# Patient Record
Sex: FEMALE | Race: WHITE | Employment: PART TIME | URBAN - METROPOLITAN AREA
[De-identification: names, ages, dates, MRNs, and addresses within clinical notes are randomized per-mention and may not be internally consistent; named-entity substitution may affect disease eponyms.]

---

## 2017-06-02 LAB
CHLAMYDIA, EXTERNAL: NEGATIVE
HIV, EXTERNAL: NEGATIVE
N. GONORRHEA, EXTERNAL: NEGATIVE
RUBELLA, EXTERNAL: NORMAL

## 2017-11-17 LAB — GRBS, EXTERNAL: NEGATIVE

## 2017-12-21 ENCOUNTER — HOSPITAL ENCOUNTER (OUTPATIENT)
Age: 27
Discharge: HOME | End: 2017-12-21
Attending: OBSTETRICS & GYNECOLOGY | Admitting: SPECIALIST
Payer: MEDICAID

## 2017-12-21 ENCOUNTER — HOSPITAL ENCOUNTER (INPATIENT)
Age: 27
LOS: 2 days | Discharge: HOME | DRG: 560 | End: 2017-12-23
Attending: OBSTETRICS & GYNECOLOGY | Admitting: SPECIALIST
Payer: MEDICAID

## 2017-12-21 VITALS
TEMPERATURE: 98.6 F | HEART RATE: 101 BPM | OXYGEN SATURATION: 100 % | WEIGHT: 180 LBS | RESPIRATION RATE: 17 BRPM | DIASTOLIC BLOOD PRESSURE: 90 MMHG | SYSTOLIC BLOOD PRESSURE: 130 MMHG | HEIGHT: 65 IN | BODY MASS INDEX: 29.99 KG/M2

## 2017-12-21 PROBLEM — G40.909 SEIZURE DISORDER (HCC): Status: ACTIVE | Noted: 2017-12-21

## 2017-12-21 PROBLEM — O47.9 UTERINE CONTRACTIONS DURING PREGNANCY: Status: ACTIVE | Noted: 2017-12-21

## 2017-12-21 PROBLEM — Z98.891 HISTORY OF CESAREAN SECTION: Status: ACTIVE | Noted: 2017-12-21

## 2017-12-21 LAB
ABO + RH BLD: NORMAL
BASOPHILS # BLD: 0 K/UL (ref 0–0.06)
BASOPHILS NFR BLD: 0 % (ref 0–2)
BLOOD GROUP ANTIBODIES SERPL: NORMAL
DIFFERENTIAL METHOD BLD: ABNORMAL
EOSINOPHIL # BLD: 0 K/UL (ref 0–0.4)
EOSINOPHIL NFR BLD: 0 % (ref 0–5)
ERYTHROCYTE [DISTWIDTH] IN BLOOD BY AUTOMATED COUNT: 13.6 % (ref 11.6–14.5)
HCT VFR BLD AUTO: 40.2 % (ref 35–45)
HGB BLD-MCNC: 13.8 G/DL (ref 12–16)
LYMPHOCYTES # BLD: 1.7 K/UL (ref 0.9–3.6)
LYMPHOCYTES NFR BLD: 18 % (ref 21–52)
MCH RBC QN AUTO: 32.2 PG (ref 24–34)
MCHC RBC AUTO-ENTMCNC: 34.3 G/DL (ref 31–37)
MCV RBC AUTO: 93.7 FL (ref 74–97)
MONOCYTES # BLD: 0.6 K/UL (ref 0.05–1.2)
MONOCYTES NFR BLD: 6 % (ref 3–10)
NEUTS SEG # BLD: 7.4 K/UL (ref 1.8–8)
NEUTS SEG NFR BLD: 76 % (ref 40–73)
PLATELET # BLD AUTO: 203 K/UL (ref 135–420)
PMV BLD AUTO: 10.1 FL (ref 9.2–11.8)
RBC # BLD AUTO: 4.29 M/UL (ref 4.2–5.3)
SPECIMEN EXP DATE BLD: NORMAL
WBC # BLD AUTO: 9.7 K/UL (ref 4.6–13.2)

## 2017-12-21 PROCEDURE — 10907ZC DRAINAGE OF AMNIOTIC FLUID, THERAPEUTIC FROM PRODUCTS OF CONCEPTION, VIA NATURAL OR ARTIFICIAL OPENING: ICD-10-PCS | Performed by: OBSTETRICS & GYNECOLOGY

## 2017-12-21 PROCEDURE — 75410000002 HC LABOR FEE PER 1 HR

## 2017-12-21 PROCEDURE — 86900 BLOOD TYPING SEROLOGIC ABO: CPT | Performed by: ADVANCED PRACTICE MIDWIFE

## 2017-12-21 PROCEDURE — 65270000029 HC RM PRIVATE

## 2017-12-21 PROCEDURE — 59025 FETAL NON-STRESS TEST: CPT

## 2017-12-21 PROCEDURE — 4A0HXCZ MEASUREMENT OF PRODUCTS OF CONCEPTION, CARDIAC RATE, EXTERNAL APPROACH: ICD-10-PCS | Performed by: OBSTETRICS & GYNECOLOGY

## 2017-12-21 PROCEDURE — 85025 COMPLETE CBC W/AUTO DIFF WBC: CPT | Performed by: ADVANCED PRACTICE MIDWIFE

## 2017-12-21 RX ORDER — MISOPROSTOL 200 UG/1
800 TABLET ORAL
Status: DISCONTINUED | OUTPATIENT
Start: 2017-12-21 | End: 2017-12-22 | Stop reason: HOSPADM

## 2017-12-21 RX ORDER — TERBUTALINE SULFATE 1 MG/ML
0.25 INJECTION SUBCUTANEOUS
Status: DISCONTINUED | OUTPATIENT
Start: 2017-12-21 | End: 2017-12-21 | Stop reason: HOSPADM

## 2017-12-21 RX ORDER — LIDOCAINE HYDROCHLORIDE 10 MG/ML
30 INJECTION, SOLUTION EPIDURAL; INFILTRATION; INTRACAUDAL; PERINEURAL AS NEEDED
Status: COMPLETED | OUTPATIENT
Start: 2017-12-21 | End: 2017-12-22

## 2017-12-21 RX ORDER — SODIUM CHLORIDE, SODIUM LACTATE, POTASSIUM CHLORIDE, CALCIUM CHLORIDE 600; 310; 30; 20 MG/100ML; MG/100ML; MG/100ML; MG/100ML
125 INJECTION, SOLUTION INTRAVENOUS CONTINUOUS
Status: DISCONTINUED | OUTPATIENT
Start: 2017-12-21 | End: 2017-12-21 | Stop reason: HOSPADM

## 2017-12-21 RX ORDER — OXYTOCIN 10 [USP'U]/ML
10 INJECTION, SOLUTION INTRAMUSCULAR; INTRAVENOUS
Status: DISCONTINUED | OUTPATIENT
Start: 2017-12-21 | End: 2017-12-21 | Stop reason: HOSPADM

## 2017-12-21 RX ORDER — ACETAMINOPHEN 650 MG/1
650 SUPPOSITORY RECTAL
Status: DISCONTINUED | OUTPATIENT
Start: 2017-12-21 | End: 2017-12-21 | Stop reason: HOSPADM

## 2017-12-21 RX ORDER — OXYTOCIN/RINGER'S LACTATE 20/1000 ML
125 PLASTIC BAG, INJECTION (ML) INTRAVENOUS CONTINUOUS
Status: DISCONTINUED | OUTPATIENT
Start: 2017-12-21 | End: 2017-12-22 | Stop reason: HOSPADM

## 2017-12-21 RX ORDER — NALBUPHINE HYDROCHLORIDE 10 MG/ML
10 INJECTION, SOLUTION INTRAMUSCULAR; INTRAVENOUS; SUBCUTANEOUS
Status: DISCONTINUED | OUTPATIENT
Start: 2017-12-21 | End: 2017-12-21 | Stop reason: HOSPADM

## 2017-12-21 RX ORDER — SODIUM CHLORIDE, SODIUM LACTATE, POTASSIUM CHLORIDE, CALCIUM CHLORIDE 600; 310; 30; 20 MG/100ML; MG/100ML; MG/100ML; MG/100ML
125 INJECTION, SOLUTION INTRAVENOUS CONTINUOUS
Status: DISCONTINUED | OUTPATIENT
Start: 2017-12-21 | End: 2017-12-22 | Stop reason: HOSPADM

## 2017-12-21 RX ORDER — METHYLERGONOVINE MALEATE 0.2 MG/ML
0.2 INJECTION INTRAVENOUS AS NEEDED
Status: DISCONTINUED | OUTPATIENT
Start: 2017-12-21 | End: 2017-12-22 | Stop reason: HOSPADM

## 2017-12-21 RX ORDER — OXYTOCIN/RINGER'S LACTATE 20/1000 ML
500 PLASTIC BAG, INJECTION (ML) INTRAVENOUS ONCE
Status: ACTIVE | OUTPATIENT
Start: 2017-12-21 | End: 2017-12-22

## 2017-12-21 RX ORDER — ONDANSETRON 2 MG/ML
4 INJECTION INTRAMUSCULAR; INTRAVENOUS
Status: DISCONTINUED | OUTPATIENT
Start: 2017-12-21 | End: 2017-12-22 | Stop reason: HOSPADM

## 2017-12-21 RX ORDER — METHYLERGONOVINE MALEATE 0.2 MG/ML
0.2 INJECTION INTRAVENOUS AS NEEDED
Status: DISCONTINUED | OUTPATIENT
Start: 2017-12-21 | End: 2017-12-21 | Stop reason: HOSPADM

## 2017-12-21 RX ORDER — TERBUTALINE SULFATE 1 MG/ML
0.25 INJECTION SUBCUTANEOUS
Status: DISCONTINUED | OUTPATIENT
Start: 2017-12-21 | End: 2017-12-22 | Stop reason: HOSPADM

## 2017-12-21 RX ORDER — OXYTOCIN/RINGER'S LACTATE 20/1000 ML
125 PLASTIC BAG, INJECTION (ML) INTRAVENOUS CONTINUOUS
Status: DISCONTINUED | OUTPATIENT
Start: 2017-12-21 | End: 2017-12-21 | Stop reason: HOSPADM

## 2017-12-21 RX ORDER — LAMOTRIGINE 50 MG/1
100 TABLET, EXTENDED RELEASE ORAL DAILY
COMMUNITY

## 2017-12-21 RX ORDER — LAMOTRIGINE 200 MG/1
TABLET ORAL DAILY
COMMUNITY

## 2017-12-21 RX ORDER — CARBOPROST TROMETHAMINE 250 UG/ML
250 INJECTION, SOLUTION INTRAMUSCULAR
Status: DISCONTINUED | OUTPATIENT
Start: 2017-12-21 | End: 2017-12-22 | Stop reason: HOSPADM

## 2017-12-21 RX ORDER — LIDOCAINE HYDROCHLORIDE 10 MG/ML
20 INJECTION, SOLUTION EPIDURAL; INFILTRATION; INTRACAUDAL; PERINEURAL AS NEEDED
Status: DISCONTINUED | OUTPATIENT
Start: 2017-12-21 | End: 2017-12-21 | Stop reason: HOSPADM

## 2017-12-21 RX ORDER — HYDROMORPHONE HCL IN 0.9% NACL 50 MG/50ML
1 PLASTIC BAG, INJECTION (ML) INJECTION
Status: DISCONTINUED | OUTPATIENT
Start: 2017-12-21 | End: 2017-12-21 | Stop reason: HOSPADM

## 2017-12-21 RX ORDER — HYDROMORPHONE HCL IN 0.9% NACL 50 MG/50ML
1 PLASTIC BAG, INJECTION (ML) INJECTION
Status: DISCONTINUED | OUTPATIENT
Start: 2017-12-21 | End: 2017-12-22 | Stop reason: HOSPADM

## 2017-12-21 RX ORDER — LAMOTRIGINE 25 MG/1
50 TABLET ORAL DAILY
Status: DISCONTINUED | OUTPATIENT
Start: 2017-12-22 | End: 2017-12-22

## 2017-12-21 RX ORDER — TRISODIUM CITRATE DIHYDRATE AND CITRIC ACID MONOHYDRATE 500; 334 MG/5ML; MG/5ML
30 SOLUTION ORAL AS NEEDED
Status: DISCONTINUED | OUTPATIENT
Start: 2017-12-21 | End: 2017-12-21 | Stop reason: HOSPADM

## 2017-12-21 RX ORDER — SALICYLIC ACID
240 POWDER (GRAM) MISCELLANEOUS ONCE
Status: DISCONTINUED | OUTPATIENT
Start: 2017-12-21 | End: 2017-12-21 | Stop reason: HOSPADM

## 2017-12-21 RX ORDER — MAG HYDROX/ALUMINUM HYD/SIMETH 200-200-20
15 SUSPENSION, ORAL (FINAL DOSE FORM) ORAL
Status: DISCONTINUED | OUTPATIENT
Start: 2017-12-21 | End: 2017-12-22 | Stop reason: HOSPADM

## 2017-12-21 RX ORDER — ONDANSETRON 2 MG/ML
4 INJECTION INTRAMUSCULAR; INTRAVENOUS
Status: DISCONTINUED | OUTPATIENT
Start: 2017-12-21 | End: 2017-12-21 | Stop reason: HOSPADM

## 2017-12-21 RX ORDER — OXYTOCIN/RINGER'S LACTATE 20/1000 ML
500 PLASTIC BAG, INJECTION (ML) INTRAVENOUS ONCE
Status: DISCONTINUED | OUTPATIENT
Start: 2017-12-21 | End: 2017-12-21 | Stop reason: HOSPADM

## 2017-12-21 RX ORDER — CARBOPROST TROMETHAMINE 250 UG/ML
250 INJECTION, SOLUTION INTRAMUSCULAR
Status: DISCONTINUED | OUTPATIENT
Start: 2017-12-21 | End: 2017-12-21 | Stop reason: HOSPADM

## 2017-12-21 RX ORDER — SALICYLIC ACID
POWDER (GRAM) MISCELLANEOUS
Status: COMPLETED
Start: 2017-12-21 | End: 2017-12-22

## 2017-12-21 RX ORDER — OXYTOCIN 10 [USP'U]/ML
10 INJECTION, SOLUTION INTRAMUSCULAR; INTRAVENOUS
Status: DISCONTINUED | OUTPATIENT
Start: 2017-12-21 | End: 2017-12-22 | Stop reason: HOSPADM

## 2017-12-21 RX ORDER — LAMOTRIGINE 100 MG/1
200 TABLET ORAL DAILY
Status: DISCONTINUED | OUTPATIENT
Start: 2017-12-22 | End: 2017-12-22

## 2017-12-21 RX ORDER — MISOPROSTOL 200 UG/1
800 TABLET ORAL
Status: DISCONTINUED | OUTPATIENT
Start: 2017-12-21 | End: 2017-12-21 | Stop reason: HOSPADM

## 2017-12-21 RX ORDER — NALBUPHINE HYDROCHLORIDE 10 MG/ML
10 INJECTION, SOLUTION INTRAMUSCULAR; INTRAVENOUS; SUBCUTANEOUS
Status: DISCONTINUED | OUTPATIENT
Start: 2017-12-21 | End: 2017-12-22 | Stop reason: HOSPADM

## 2017-12-21 NOTE — H&P
History & Physical    Name: Matilde Oseguera MRN: [id number]  SSN: [id number]    YOB: 1990  Age: 32 y.o. Sex: female        Subjective:     Idalia Jain is c/o contractions since last night, contractions now closer in frequency (q2-5 mins) and increasing in strength. Denies bleeding, LOF, and baby is having good FM. Estimated Date of Delivery: 17  OB History      Para Term  AB Living    2 1 1   1    SAB TAB Ectopic Molar Multiple Live Births        0 1          MsRobert Hedrick is admitted with pregnancy at 40w3d for labor. . Prenatal course was complicated by history of prior C/S and seizure disorder, and abnormal 1 hour. Prenatal care has been followed by Marilia FISCHER Amira Stanton starting on 17 at 11.4 weeks. Please see prenatal records for details. Abnormal 1 hr gtt at 138, with 1 abnormal value on the 3hr gtt. Total weight gain during pregnancy is 28#. Admitted to 3415/01. Past Medical History:   Diagnosis Date    Diabetes ([facility] [address]     gestational diabetes    Epilepsy [facility])      Past Surgical History:   Procedure Laterality Date     DELIVERY ONLY  2016    HX WISDOM TEETH EXTRACTION  2016     Social History     Occupational History    Not on file. Social History Main Topics    Smoking status: Former Smoker     Quit date:     Smokeless tobacco: Not on file    Alcohol use Yes      Comment: occasional    Drug use: No    Sexual activity: Yes     Partners: Male     Birth control/ protection: None     History reviewed. No pertinent family history. No Known Allergies  Prior to Admission medications    Medication Sig Start Date End Date Taking? Authorizing Provider   lamoTRIgine (LAMICTAL) 200 mg tablet Take  by mouth daily. Yes Historical Provider   lamoTRIgine (LAMICTAL XR) 50 mg tr24 ER tablet Take 100 mg by mouth daily. Yes Historical Provider   folic acid (FOLVITE) 1 mg tablet Take 4 mcg by mouth daily.    Yes Historical Provider   PRENATAL VIT W-CA,FE,FA,<1 MG, (PRENATAL #2 PO) Take  by mouth. Yes Historical Provider   calcium carbonate (TUMS) 200 mg calcium (500 mg) chew Take 1 Tab by mouth daily. Yes Historical Provider   docusate sodium 100 mg tab Take 1-2 Tabs by mouth daily. 5/12/16   Aliza Rendon CNM   cholecalciferol (VITAMIN D3) 1,000 unit tablet Take 1,000 Units by mouth daily. Historical Provider        Review of Systems: A comprehensive review of systems was negative except for that written in the HPI. Objective:     Vitals:  Vitals:    12/21/17 0735 12/21/17 0740   Temp:  98.2 °F (36.8 °C)   Weight: 81.6 kg (180 lb)    Height: 5' 5\" (1.651 m)         Physical Exam:  Fundus: soft and non tender  Perineum: blood absent, amniotic fluid absent  Membranes:  Intact  Fetal Heart Rate: Baseline: 135 per minute  Variability: moderate  Accelerations: yes  Decelerations: none    Prenatal Labs:     O pos, im, HIV, HepB, RPR, and GC/CT neg. Group B Strep was negative. Assessment/Plan:   Assessment: IUP @ 40w3d, FHT Cat 1. TOLAC. Plan: Admit for Reassuring fetal status, Labor  Continue expectant management, Continue plan for vaginal delivery.       Signed By:  Ro Thompson CNM     December 21, 2017

## 2017-12-21 NOTE — IP AVS SNAPSHOT
[address] Patient: Bhavesh Wray MRN: [id number] About your hospitalization You were admitted on:  2017 You last received care in the:  [place] [address] You were discharged on:  2017 Why you were hospitalized Your primary diagnosis was:   (Vaginal Birth After ) Your diagnoses also included:  Labor And Delivery, Indication For Care, Uterine Contractions During Pregnancy Things You Need To Do (next 8 weeks) Follow up with [facility], [address] [place] [address] in 6 week(s) Phone:  [phone] Where:  [address] [place], [place] [address]jaycee Kim Gallegos, [address], [place] [id number] Discharge Orders None A check juan pablo indicates which time of day the medication should be taken. My Medications STOP taking these medications   
 folic acid 1 mg tablet Commonly known as:  FOLVITE  
   
  
  
TAKE these medications as instructed Instructions Each Dose to Equal  
 Morning Noon Evening Bedtime  
 calcium carbonate 200 mg calcium (500 mg) Chew Commonly known as:  TUMS Your last dose was: Your next dose is: Take 1 Tab by mouth daily. 1 Tab  
    
   
   
   
  
 cholecalciferol 1,000 unit tablet Commonly known as:  VITAMIN D3 Your last dose was: Your next dose is: Take 1,000 Units by mouth daily. 1000 Units  
    
   
   
   
  
 docusate sodium 100 mg Tab Your last dose was: Your next dose is: Take 1-2 Tabs by mouth daily. 1-2 Tab * lamoTRIgine 200 mg tablet Commonly known as: LaMICtal  
   
Your last dose was: Your next dose is: Take  by mouth daily. * lamoTRIgine 50 mg Tr24 ER tablet Commonly known as: LaMICtal XR Your last dose was: Your next dose is: Take 100 mg by mouth daily. 100 mg PRENATAL #2 PO Your last dose was: Your next dose is: Take  by mouth. * Notice: This list has 2 medication(s) that are the same as other medications prescribed for you. Read the directions carefully, and ask your doctor or other care provider to review them with you. Discharge Instructions CONGRATULATIONS ON THE BIRTH OF YOUR BABY! The first six weeks after childbirth is a time of physical and emotional adjustment. This handout will help to answer questions and provide guidance during the postpartum period. Every family's adjustment is unique, so please call if you have further concerns. At anytime we can be reached at [phone]. During office hours please ask to speak to a charge nurse. After hours, the answering service will take a message and the Nurse-Midwife on-call will return your call. If your question can wait until office hours: Monday-Friday 8:30-4:00, please do so. For emergencies or urgent concerns do not hesitate to call us after hours. DIET Your body is in need of a well-balanced, high protein diet to recuperate from birth. Please continue to take your prenatal vitamins for 6 weeks or as long as you are breastfeeding. Continue to drink at least 6-8 cups of water or other liquid a day. A breastfeeding mother also needs extra protein, calories and calcium containing foods. It is a good rule to drink fluids with every feeding in order to maintain an adequate milk supply and avoid dehydration. Your baby will probably not be bothered by things in your diet, but if the baby seems extremely fussy or develops a rash, you may want to discuss possible food intolerances with your baby's care provider. PAIN MEDICATIONS Acetaminophen (Tylenol), ibuprofen (Motrin), or other prescribed pain medication may be taken as directed to relieve discomfort. The above medications pass in very minimal amounts into the breast milk and usually will not cause problems. There are medications that may affect the baby, so please consult your baby's care provider before taking medication. If you are breastfeeding, be sure to mention this to any care provider you see so that medications that are safe may be selected. There is an excellent resource called HipFlat that is a resource for medication safety in pregnancy and lactation. You can visit their website at iTwin/ or call them toll free at [phone] if you have any questions about medication safety. UTERINE INVOLUTION / VAGINAL BLEEDING Involution is the process of the uterus returning to pre-pregnant size. It will take approximately six weeks for this process to occur. To achieve this size your uterus becomes firm to slow bleeding loss from the placental site. The first 7 days after birth, the bleeding is red and heavy. It may change with your activity and position. Some small clots are normal.   After ten days, the bleeding should be pale pink and slowed considerably. The next several weeks may progress to a pink, mucousy discharge. This may continue for 6-8 weeks, depending on your activity. During the first four weeks after delivery we recommend using sanitary pads instead of tampons. Douching should also be avoided, but it is fine to take a tub bath so long as the tub is very clean. ACTIVITY/EXERCISE Adequate rest is essential to recovery. Try to rest or sleep when the baby sleeps. After two weeks, you may begin going for short walks, doing Kegel exercises and abdominal crunches. Avoid heavy, jarring or aerobic exercises. Remember to start out slowly and build up to your previous fitness level. Use common sense and don't overdo as rest is important and the benefits of increased rest are a quicker recovery. For the first two weeks after a  try to limit trips up or down steps. Do not lift anything heavier than the baby during this time. Lifting the baby or other objects should be done by bending at the knees rather than the waist.  Driving should be avoided during the first two to three weeks until you have the strength to push firmly on the brakes in case of an emergency. You may ride as a passenger, but DO wear a seat belt at all times. After a few weeks, you may resume normal activity at whatever pace is comfortable for you. Exercise may also be resumed gradually. Walking is a good way to start. Finally, try to be reasonable in your expectations. Caring for a new baby after major surgery can be quite trying. Arrange for assistance at home to ensure that you get enough rest.  
 
POSTPARTUM CHECK You may call the office when you return home to set up a postpartum visit. Most patients will be seen at 6 weeks after delivery, but after a  or other circumstances you may be seen in 2 weeks or less. If you are discharged from the hospital with staples that must be removed, you will be asked to come in sooner. At your postpartum visit, a pelvic exam may be performed. If you are having any problems or concerns, please do not hesitate to call. Once again our number is [phone]. MOOD CHANGES Significant hormonal changes occur in the days following delivery, and as a result, many women experience brief episodes of tearfulness or feeling \"blue. \"  These emotional swings may be made worse by lack of sleep and by the adjustments inherent in becoming a mother. For some women, these fluctuations are minor. For others, they are overwhelming; creating feelings of anxiety, depression, or the inability to cope.   If you have difficulty functioning as a result of feeling down, or if the mood changes seem severe, do not improve, or result is thoughts of harming yourself or others CALL RIGHT AWAY. PERINEAL CARE The basic goals of perineal care are to prevent infection, to relieve pain and promote healing. Your stitches will dissolve in four to six weeks, and do not need to be removed. After urinating, please continue to clean with warm water from front to back. Please continue sitz baths as instructed twice a day for a week or as needed. Call the office if you see pus in the suture site, or have unusual or severe swelling or pain that seems to be getting worse. INCISION CARE If you had a , clean and dry the incision gently as you would the rest of your body. Washing over the area with soap and water, and showering are fine. If steri-strips are present they will gradually come off with time. Tub baths are permitted. You may experience numbness and burning in the area surrounding the incision which usually resolves gradually over the next several weeks or months. RETURN OF MENSTRUATION Your first menstrual period may occur as soon as four to six weeks after your delivery if you are not breast-feeding. If breast-feeding it is more difficult to predict when your first period will occur. Even if you are not yet menstruating, you may be ovulating and it may be possible to conceive again. It is common for your first period after childbirth to be very heavy with an increased amount of cramping. BREASTS Breast-feeding Mothers: Colostrum is excreted in the first 24-72 hours. Mature breast milk will appear on the 2nd to 5th day. Engorgement may occur with the mature milk making your breasts feel warm and very full. Frequent feedings will make you more comfortable. Babies do not nurse on regular schedules. Nursing every 1 1/2 to 2 hours is normal and frequent feeding DOES NOT mean you are not making enough milk. To avoid nipple confusion, do not give bottles for the first 4 weeks.   Growth spurts are common and may require more frequent feedings. This is the way baby increases your milk supply. During a growth spurt, you may feel you are feeding very frequently and that your breasts are \"empty. \"  Don't worry, your milk is produced by supply and demand so this increased frequency of feeding will increase your milk supply within 48 hours. Sore nipples may occur with frequent feedings and are sometimes also caused by improper latch. Check for a proper latch. Baby should have a wide open mouth. Use different positions at each feeding if possible. Express a small amount of colostrum or breast milk onto the sore area and leave bra flaps unlatched until dry. The lactation consultant at [facility] is available for outpatient consultation without charge. Call [phone] from Monday-Friday 9:00am- 3:00pm to arrange an outpatient appointment with her. Local [facility] and consultants may also be very helpful. If You Are Not Breast-feeding: You will experience swelling, engorgement and some milk production. There are no safe medications available to stop lactation. Some remedies for engorgement include: wearing a tight bra, ice packs and cold green cabbage leaves placed between the breast and your bra. Change these frequently. Tylenol or Motrin should help with the discomfort. SEXUAL ADJUSTMENTS We recommend that you wait at least four weeks before resuming sexual intercourse. A sore perineum, a demanding baby and fatigue will certainly affect your ability to enjoy lovemaking! A vaginal lubricant is recommended to help with any dryness. It is very important to remember that you will ovulate BEFORE your first period and can conceive. If you do not wish another pregnancy right away, please take precautions to avoid pregnancy. If you would like a prescription method of birth control, please discuss this with us at your 6 week visit.    
 
 
 
ELIMINATION 
 We remind all postpartum patients that it may take a few days for your bowels to return to normal, especially if you had a long labor. For those who had C-sections or severe lacerations, we recommend that you use a stool softener twice daily for at least two weeks. Many stool softeners are over-the-counter. Colace (Docusate Sodium) is recommended. Bulk forming agents such as Metamucil or Fibercon may be used daily in addition to a stool softener to promote regular bowel movements. Eating fresh fruits and vegetables along with whole grains is helpful as well. Do not be afraid to have a bowel movement as your stitches will not \"come out\" in the course of having a bowel movement. Urination may be difficult due to soreness around the urethra, or as an after effect of epidural.  This is temporary and can be helped  by squirting water over the perineum or try going in the shower. Hemorrhoids are common after birth. Tucks pads, Anusol cream and avoiding constipation are helpful. If constipation does occur, you may take Milk of Magnesia or Senekot according to the package instructions. DANGER SIGNS! CALL WITHOUT DELAY IF YOU ARE EXPERIENCING ANY OF THE FOLLOWING: 
* Unusually heavy bleeding, soaking more than 1 or more pads in an hour. * Vaginal discharge with strong foul odor. * Fever of 101 or higher * Unusual pain or tenderness in the abdominal area. * If breasts are red, hot or have a painful lump. * Depression that persists longer than 1-2 weeks or is severe. * Any urinary frequency accompanied by urgency or pain. * A lump in leg or calf especially if painful, warm or red. We thank you for choosing us for your prenatal care and/or delivery. We wish you all happiness and health with your baby for his or her lifetime! Introducing [facility]!    
 Fausto Zaldivar introduces [facility] patient portal. Now you can access parts of your medical record, email your doctor's office, and request medication refills online. 1. In your internet browser, go to https://Send Word Now. StyleQ/Send Word Now 2. Click on the First Time User? Click Here link in the Sign In box. You will see the New Member Sign Up page. 3. Enter your Montnets Access Code exactly as it appears below. You will not need to use this code after youve completed the sign-up process. If you do not sign up before the expiration date, you must request a new code. · Montnets Access Code: [email or web address] Expires: 3/21/2018  1:22 PM 
 
4. Enter the last four digits of your Social Security Number (xxxx) and Date of Birth (mm/dd/yyyy) as indicated and click Submit. You will be taken to the next sign-up page. 5. Create a Montnets ID. This will be your Montnets login ID and cannot be changed, so think of one that is secure and easy to remember. 6. Create a Montnets password. You can change your password at any time. 7. Enter your Password Reset Question and Answer. This can be used at a later time if you forget your password. 8. Enter your e-mail address. You will receive e-mail notification when new information is available in [address] 9. Click Sign Up. You can now view and download portions of your medical record. 10. Click the Download Summary menu link to download a portable copy of your medical information. If you have questions, please visit the Frequently Asked Questions section of the Montnets website. Remember, Montnets is NOT to be used for urgent needs. For medical emergencies, dial 911. Now available from your iPhone and Android! Providers Seen During Your Hospitalization Provider Specialty Primary office phone Jorge Timmons MD Obstetrics & Gynecology [phone] Your Primary Care Physician (PCP) Primary Care Physician Office Phone Office Fax Cameron Headings [phone] You are allergic to the following No active allergies Recent Documentation Breastfeeding? OB Status Smoking Status Unknown Recent pregnancy Former Smoker Emergency Contacts Name Discharge Info Relation Home Work Mobile Spencer Oh DISCHARGE CAREGIVER [3] Spouse [3]   [phone] Patient Belongings The following personal items are in your possession at time of discharge: 
  Dental Appliances: None  Visual Aid: None      Home Medications: None   Jewelry: Bracelet, Earrings, Necklace, With patient  Clothing: At bedside    Other Valuables: At bedside Discharge Instructions Attachments/References SAFE SLEEP AND SUDDEN INFANT DEATH SYNDROME (SIDS): PEDIATRIC: GENERAL INFO (ENGLISH) SHAKEN BABY SYNDROME: PEDIATRIC (ENGLISH) DEPRESSION: POSTPARTUM (ENGLISH) PARENTING: STRESS AND INFANTS (ENGLISH) Patient Handouts Learning About Safe Sleep for Babies Why is safe sleep important? Enjoy your time with your baby, and know that you can do a few things to keep your baby safe. Following safe sleep guidelines can help prevent sudden infant death syndrome (SIDS) and reduce other sleep-related risks. SIDS is the death of a baby younger than 1 year with no known cause. Talk about these safety steps with your  providers, family, friends, and anyone else who spends time with your baby. Explain in detail what you expect them to do. Do not assume that people who care for your baby know these guidelines. What are the tips for safe sleep? Putting your baby to sleep · Put your baby to sleep on his or her back, not on the side or tummy. This reduces the risk of SIDS. · Once your baby learns to roll from the back to the belly, you do not need to keep shifting your baby onto his or her back. But keep putting your baby down to sleep on his or her back.  
· Keep the room at a comfortable temperature so that your baby can sleep in lightweight clothes without a blanket. Usually, the temperature is about right if an adult can wear a long-sleeved T-shirt and pants without feeling cold. Make sure that your baby doesn't get too warm. Your baby is likely too warm if he or she sweats or tosses and turns a lot. · Consider offering your baby a pacifier at nap time and bedtime if your doctor agrees. · The American Academy of Pediatrics recommends that you do not sleep with your baby in the bed with you. · When your baby is awake and someone is watching, allow your baby to spend some time on his or her belly. This helps your baby get strong and may help prevent flat spots on the back of the head. Cribs, cradles, bassinets, and bedding · For the first 6 months, have your baby sleep in a crib, cradle, or bassinet in the same room where you sleep. · Keep soft items and loose bedding out of the crib. Items such as blankets, stuffed animals, toys, and pillows could block your baby's mouth or trap your baby. Dress your baby in sleepers instead of using blankets. · Make sure that your baby's crib has a firm mattress (with a fitted sheet). Don't use bumper pads or other products that attach to crib slats or sides. They could block your baby's mouth or trap your baby. · Do not place your baby in a car seat, sling, swing, bouncer, or stroller to sleep. The safest place for a baby is in a crib, cradle, or bassinet that meets safety standards. What else is important to know? More about sudden infant death syndrome (SIDS) SIDS is very rare. In most cases, a parent or other caregiver puts the baby-who seems healthy-down to sleep and returns later to find that the baby has . No one is at fault when a baby dies of SIDS. A SIDS death cannot be predicted, and in many cases it cannot be prevented. Doctors do not know what causes SIDS. It seems to happen more often in premature and low-birth-weight babies.  It also is seen more often in babies whose mothers did not get medical care during the pregnancy and in babies whose mothers smoke. Do not smoke or let anyone else smoke in the house or around your baby. Exposure to smoke increases the risk of SIDS. If you need help quitting, talk to your doctor about stop-smoking programs and medicines. These can increase your chances of quitting for good. Breastfeeding your child may help prevent SIDS. Be wary of products that are billed as helping prevent SIDS. Talk to your doctor before buying any product that claims to reduce SIDS risk. What to do while still pregnant · See your doctor regularly. Women who see a doctor early in and throughout their pregnancies are less likely to have babies who die of SIDS. · Eat a healthy, balanced diet, which can help prevent a premature baby or a baby with a low birth weight. · Do not smoke or let anyone else smoke in the house or around you. Smoking or exposure to smoke during pregnancy increases the risk of SIDS. If you need help quitting, talk to your doctor about stop-smoking programs and medicines. These can increase your chances of quitting for good. · Do not drink alcohol or take illegal drugs. Alcohol or drug use may cause your baby to be born early. Follow-up care is a key part of your child's treatment and safety. Be sure to make and go to all appointments, and call your doctor if your child is having problems. It's also a good idea to know your child's test results and keep a list of the medicines your child takes. Where can you learn more? Go to http://eddie-collin.info/. Enter U716 in the search box to learn more about \"Learning About Safe Sleep for Babies. \" Current as of: May 12, 2017 Content Version: 11.4 © 5738-8792 Healthwise, Incorporated. Care instructions adapted under license by [employer] (which disclaims liability or warranty for this information).  If you have questions about a medical condition or this instruction, always ask your healthcare professional. [place] [address] any warranty or liability for your use of this information. Shaken Baby Syndrome: Care Instructions Your Care Instructions If you want to save this information but don't think it is safe to take it home, see if a trusted friend can keep it for you. Plan ahead. Know who you can call for help, and memorize the phone number. Be careful online too. Your online activity may be seen by others. Do not use your personal computer or device to read about this topic. Use a safe computer such as one at work, a friend's house, or a [employer] [place] [employer]. There is a big difference between normal play activities and violent movements that harm a child. Bouncing a child on a knee or gently tossing a child in the air does not cause shaken baby syndrome. Shaken baby syndrome is brain damage that occurs when a baby is shaken or is slammed or thrown against an object. It is a form of child abuse that occurs when the baby's caregiver loses control. Shaking a baby or striking a baby's head can cause bruising and bleeding to the brain. Caring for a baby can be trying at times. You may have periods of feeling overwhelmed, especially if your baby is crying. Many babies cry from 1 to 5 hours out of every 24 hours during the first few months of life. Some babies cry more. You can learn ways to help stay in control of your emotions when you feel stressed. Then you can be with your baby in a loving and healthy way. Follow-up care is a key part of your child's treatment and safety. Be sure to make and go to all appointments, and call your doctor if your child is having problems. It's also a good idea to know your child's test results and keep a list of the medicines your child takes. How can you care for your child at home? · Take steps to protect yourself from being stressed. ¨ Learn about how children develop so that you will understand why your child behaves as he or she does. Talk to your doctor about parent education classes or books. ¨ Talk with other parents about the ways they cope with the demands of parenting. ¨ Ask for help when you need time for yourself. ¨ Take short breaks and naps whenever you can. · If your baby cries a lot, try these ways to take care of his or her needs or to remove yourself safely. ¨ Check to see if your baby is hungry or has a dirty diaper. ¨ Hold your baby to your chest while you take and release deep breaths. ¨ Swing, rock, or walk with your baby. Some babies love to be taken for car rides or stroller walks. ¨ Tell stories and sing songs to your baby, who loves to hear your voice. ¨ Let your baby cry alone for a few minutes if his or her needs are taken care of and he or she is in a safe place, such as a crib. Remove yourself to another room where you can breathe calmly and try to clear your head. Count to 10 with each breath. ¨ Talk to your doctor if your baby continues to cry for what seems to be no reason. · Try some steps for relieving stress in your life. There are self-help books and classes on yoga, relaxation techniques, and other ways to relieve stress. Counseling and anger management training help many parents adjust to new pressures. · Never shake a baby. Never slap or hit a baby. · Take steps to protect your child from abuse by others. ¨ Screen your potential  providers to find out their backgrounds and attitudes about . ¨ If you suspect child abuse and the child is not in immediate danger, contact your local child protection services or police. ¨ Do not confront someone who you suspect is a child abuser. This may cause more harm to the child. ¨ If you are concerned about a child's well-being, call the [facility] hotline at 2-554-3-A-CHILD (7-394.546.2135). When should you call for help? Call 911 anytime you think a child may need emergency care. For example, call if: 
? · A child is unconscious or is having trouble breathing. ? · A baby has been shaken. It is extremely important that a shaken baby gets medical care right away. ?Call your doctor now or seek immediate medical care if: 
? · You are concerned that you cannot control your actions around your child. ? · You are concerned that a child's caregiver cannot control his or her actions around a child. ? Watch closely for changes in your child's health, and be sure to contact your doctor if your child has any problems. Where can you learn more? Go to http://eddie-collin.info/. Enter H891 in the search box to learn more about \"Shaken Baby Syndrome: Care Instructions. \" Current as of: May 12, 2017 Content Version: 11.4 © 4660-6555 [employer]. Care instructions adapted under license by [employer] (which disclaims liability or warranty for this information). If you have questions about a medical condition or this instruction, always ask your healthcare professional. [place] [address] any warranty or liability for your use of this information. Depression After Childbirth: Care Instructions Your Care Instructions Many women get the \"baby blues\" during the first few days after childbirth. You may lose sleep, feel irritable, and cry easily. You may feel happy one minute and sad the next. Hormone changes are one cause of these emotional changes. Also, the demands of a new baby, along with visits from relatives or other family needs, add to a mother's stress. The \"baby blues\" often peak around the fourth day. Then they ease up in less than 2 weeks. If your moodiness or anxiety lasts for more than 2 weeks, or if you feel like life is not worth living, you may have postpartum depression.  This is different for each mother. Some mothers with serious depression may worry intensely about their infant's well-being. Others may feel distant from their child. Some mothers might even feel that they might harm their baby. A mother may have signs of paranoia, wondering if someone is watching her. Depression is not a sign of weakness. It is a medical condition that requires treatment. Medicine and counseling often work well to reduce depression. Talk to your doctor about taking antidepressant medicine while breastfeeding. Follow-up care is a key part of your treatment and safety. Be sure to make and go to all appointments, and call your doctor if you are having problems. It's also a good idea to know your test results and keep a list of the medicines you take. How do you know if you are depressed? With all the changes in your life, you may not know if you are depressed. Pregnancy sometimes causes changes in how you feel that are similar to the symptoms of depression. Symptoms of depression include: · Feeling sad or hopeless and losing interest in daily activities. These are the most common symptoms of depression. · Sleeping too much or not enough. · Feeling tired. You may feel as if you have no energy. · Eating too much or too little. · Writing or talking about death, such as writing suicide notes or talking about guns, knives, or pills. Keep the numbers for these national suicide hotlines: 4-717-640-TALK (6-964.296.5476) and 9-013-ROZWOKP (7-278.515.5646). If you or someone you know talks about suicide or feeling hopeless, get help right away. How can you care for yourself at home? · Be safe with medicines. Take your medicines exactly as prescribed. Call your doctor if you think you are having a problem with your medicine. · Eat a healthy diet so that you can keep up your energy. · Get regular daily exercise, such as walks, to help improve your mood. · Get as much sunlight as possible. Keep your shades and curtains open. Get outside as much as you can. · Avoid using alcohol or other substances to feel better. · Get as much rest and sleep as possible. Avoid doing too much. Being too tired can increase depression. · Play stimulating music throughout your day and soothing music at night. · Schedule outings and visits with friends and family. Ask them to call you regularly, so that you do not feel alone. · Ask for help with preparing food and other daily tasks. Family and friends are often happy to help a mother with a . · Be honest with yourself and those who care about you. Tell them about your struggle. · Join a support group of new mothers. No one can better understand the challenges of caring for a  than other new mothers. · If you feel like life is not worth living or are feeling hopeless, get help right away. Keep the numbers for these national suicide hotlines: 5-388-273-TALK (0-638.946.7550) and 9-451-ZZWVKFH (0-415.825.1195). When should you call for help? Call 911 anytime you think you may need emergency care. For example, call if: 
? · You feel you cannot stop from hurting yourself, your baby, or someone else. ?Call your doctor now or seek immediate medical care if: 
? · You are having trouble caring for yourself or your baby. ? · You hear voices. ? Watch closely for changes in your health, and be sure to contact your doctor if: 
? · You have problems with your depression medicine. ? · You do not get better as expected. Where can you learn more? Go to http://eddie-collin.info/. Enter W612 in the search box to learn more about \"Depression After Childbirth: Care Instructions. \" Current as of: May 12, 2017 Content Version: 11.4 © 5140-0965 [employer].  Care instructions adapted under license by [employer] (which disclaims liability or warranty for this information). If you have questions about a medical condition or this instruction, always ask your healthcare professional. [address] any warranty or liability for your use of this information. Stress in Parents of Infants: Care Instructions Your Care Instructions Meeting the increased demands of being a new parent can be a big challenge. It is easy to get overtired and overwhelmed during the first weeks. What used to be a simple chore, such as buying groceries, is not so simple now. Plus, you have new chores, including feeding and changing your new baby. At the end of the day, you may be so tired that you feel like crying. Instead of looking forward to the next day, you may be dreading tomorrow. Like many new parents, you are burned out from the stress of having a new baby. Stress affects each of us differently, and the most effective ways to relieve it are different for each person. You can try different methods to find out which ones work best for you. As the weeks go by, you will begin to develop a rhythm with your baby. Tasks that now seem to take forever will become easier. Many women get the \"baby blues\" during the first few days after childbirth. If you are a new mother and the \"baby blues\" last more than a few days, call your doctor right away. Depression is a medical condition that requires treatment. Follow-up care is a key part of your treatment and safety. Be sure to make and go to all appointments, and call your doctor if you are having problems. It's also a good idea to know your test results and keep a list of the medicines you take. How can you care for yourself at home? · Be kind to yourself. Your new baby takes a lot of work, but he or she can give you a lot of pleasure too. Do not worry about housekeeping for a while. · Allow your friends to bring you meals or do chores. · Limit visitors to as few as you feel you can handle, or ask them not to visit for a while. Before they come, set a limit on how long they will stay. · Sleep when your baby sleeps. Even a short nap helps. · Find what triggers your stress, and avoid those things as much as you can. [employer] comment for future enhancement · If you breastfeed, learn how to collect and store some breast milk so your partner or  can feed the baby while you sleep. · Eat a balanced diet so you can keep up your energy. · Drink plenty of fluids throughout the day. · Avoid caffeine and alcohol. Caffeine is found in coffee, tea, cola drinks, chocolate, and other foods. · Limit medicines that can make you more tired, such as tranquilizers and cold and allergy medicines. · Get regular daily exercise, such as walks, to help improve your mood. Rest after you exercise. · Be honest with yourself and those who care about you. Tell them you are stressed and tired. · Talking to other new parents can help. Ask your doctor or child's doctor to suggest support groups for new parents. Hearing that someone else is having the same experiences you are can help a lot. · If you have the baby blues for more than a few days, call your doctor right away. When should you call for help? Call 911 anytime you think you may need emergency care. For example, call if: 
? · You have thoughts of hurting yourself, your baby, or another person. ?Call your doctor now or seek immediate medical care if: 
? · You are having trouble caring for yourself or your baby. ? Watch closely for changes in your health, and be sure to contact your doctor if you have any problems. Where can you learn more? Go to http://eddie-collin.info/. Enter H142 in the search box to learn more about \"Stress in Parents of Infants: Care Instructions. \" Current as of: May 12, 2017 Content Version: 11.4 © 4195-6286 [employer]wise[employer]. Care instructions adapted under license by [employer] (which disclaims liability or warranty for this information). If you have questions about a medical condition or this instruction, always ask your healthcare professional. [address] any warranty or liability for your use of this information. Please provide this summary of care documentation to your next provider. Signatures-by signing, you are acknowledging that this After Visit Summary has been reviewed with you and you have received a copy. Patient Signature:  ____________________________________________________________ Date:  ____________________________________________________________  
  
[facility] Provider Signature:  ____________________________________________________________ Date:  ____________________________________________________________

## 2017-12-21 NOTE — IP AVS SNAPSHOT
[address] Patient: Milan Lenz MRN: [id number] JMY:[id number] My Medications ASK your physician about these medications Instructions Each Dose to Equal  
 Morning Noon Evening Bedtime  
 calcium carbonate 200 mg calcium (500 mg) Chew Commonly known as:  TUMS Your last dose was: Your next dose is: Take 1 Tab by mouth daily. 1 Tab  
    
   
   
   
  
 cholecalciferol 1,000 unit tablet Commonly known as:  VITAMIN D3 Your last dose was: Your next dose is: Take 1,000 Units by mouth daily. 1000 Units  
    
   
   
   
  
 docusate sodium 100 mg Tab Your last dose was: Your next dose is: Take 1-2 Tabs by mouth daily. 1-2 Tab  
    
   
   
   
  
 folic acid 1 mg tablet Commonly known as:  [employer] Your last dose was: Your next dose is: Take 4 mcg by mouth daily. 4 mcg * lamoTRIgine 200 mg tablet Commonly known as: LaMICtal  
   
Your last dose was: Your next dose is: Take  by mouth daily. * lamoTRIgine 50 mg Tr24 ER tablet Commonly known as: LaMICtal XR Your last dose was: Your next dose is: Take 100 mg by mouth daily. 100 mg PRENATAL #2 PO Your last dose was: Your next dose is: Take  by mouth. * Notice: This list has 2 medication(s) that are the same as other medications prescribed for you. Read the directions carefully, and ask your doctor or other care provider to review them with you.

## 2017-12-21 NOTE — IP AVS SNAPSHOT
Jesse Arriola 
 
 
 [address] [place] [address] [address] Patient: Stone Mckeon MRN: [id number] VOC:3/08/2118 About your hospitalization You were admitted on:  2017 You last received care in the:  [address] [place] [address] You were discharged on:  2017 Why you were hospitalized Your primary diagnosis was:  History Of  Section Your diagnoses also included:  Seizure Disorder (Hcc) Things You Need To Do (next 8 weeks) Follow up with Jovan Kim MD  
  
Phone:  [phone] Where:  [address] Discharge Orders None A check juan pablo indicates which time of day the medication should be taken. My Medications ASK your physician about these medications Instructions Each Dose to Equal  
 Morning Noon Evening Bedtime  
 calcium carbonate 200 mg calcium (500 mg) Chew Commonly known as:  TUMS Your last dose was: Your next dose is: Take 1 Tab by mouth daily. 1 Tab  
    
   
   
   
  
 cholecalciferol 1,000 unit tablet Commonly known as:  VITAMIN D3 Your last dose was: Your next dose is: Take 1,000 Units by mouth daily. 1000 Units  
    
   
   
   
  
 docusate sodium 100 mg Tab Your last dose was: Your next dose is: Take 1-2 Tabs by mouth daily. 1-2 Tab  
    
   
   
   
  
 folic acid 1 mg tablet Commonly known as:  Google Your last dose was: Your next dose is: Take 4 mcg by mouth daily. 4 mcg * lamoTRIgine 200 mg tablet Commonly known as: LaMICtal  
   
Your last dose was: Your next dose is: Take  by mouth daily. * lamoTRIgine 50 mg Tr24 ER tablet Commonly known as: LaMICtal XR Your last dose was: Your next dose is: Take 100 mg by mouth daily. 100 mg PRENATAL #2 PO Your last dose was: Your next dose is: Take  by mouth. * Notice: This list has 2 medication(s) that are the same as other medications prescribed for you. Read the directions carefully, and ask your doctor or other care provider to review them with you. Discharge Instructions None Introducing [facility] SERVICES! Malcolm Romeo introduces [employer] patient portal. Now you can access parts of your medical record, email your doctor's office, and request medication refills online. 1. In your internet browser, go to [email or web address] 2. Click on the First Time User? Click Here link in the Sign In box. You will see the New Member Sign Up page. 3. Enter your [employer] Access Code exactly as it appears below. You will not need to use this code after youve completed the sign-up process. If you do not sign up before the expiration date, you must request a new code. · [employer] Access Code: [email or web address] Expires: 3/21/2018  1:22 PM 
 
4. Enter the last four digits of your Social Security Number (xxxx) and Date of Birth (mm/dd/yyyy) as indicated and click Submit. You will be taken to the next sign-up page. 5. Create a [employer] ID. This will be your [employer] login ID and cannot be changed, so think of one that is secure and easy to remember. 6. Create a [employer] password. You can change your password at any time. 7. Enter your Password Reset Question and Answer. This can be used at a later time if you forget your password. 8. Enter your e-mail address. You will receive e-mail notification when new information is available in [address] 9. Click Sign Up. You can now view and download portions of your medical record. 10. Click the Download Summary menu link to download a portable copy of your medical information. If you have questions, please visit the Frequently Asked Questions section of the MyChart website. Remember, [employer] is NOT to be used for urgent needs. For medical emergencies, dial 911. Now available from your iPhone and Android! Providers Seen During Your Hospitalization Provider Specialty Primary office phone Chen Wilkins MD Obstetrics & Gynecology [phone] Your Primary Care Physician (PCP) Primary Care Physician Office Phone Office Fax [employer] [place] [phone] You are allergic to the following No active allergies Recent Documentation Height Weight Breastfeeding? BMI OB Status Smoking Status 1.651 m 81.6 kg No 29.95 kg/m2 Pregnant Former Smoker Emergency Contacts Name Discharge Info Relation Home Work [employer] DISCHARGE CAREGIVER [3] Spouse [3]   [phone] Patient Belongings The following personal items are in your possession at time of discharge: 
  Dental Appliances: None  Visual Aid: None      Home Medications: None   Jewelry: Bracelet, Earrings, Necklace, Ring  Clothing: At bedside, Bathrobe, Footwear, Pants, Shirt    Other Valuables: At bedside, Cell Phone Discharge Instructions Attachments/References PREGNANCY: PRECAUTIONS (ENGLISH) PREGNANCY: WEEK 40 (ENGLISH) PREGNANCY: KICK COUNTS (ENGLISH) Patient Handouts Pregnancy Precautions: Care Instructions Your Care Instructions There is no sure way to prevent labor before your due date ( labor) or to prevent most other pregnancy problems. But there are things you can do to increase your chances of a healthy pregnancy. Go to your appointments, follow your doctor's advice, and take good care of yourself. Eat well, and exercise (if your doctor agrees). And make sure to drink plenty of water. Follow-up care is a key part of your treatment and safety.  Be sure to make and go to all appointments, and call your doctor if you are having problems. It's also a good idea to know your test results and keep a list of the medicines you take. How can you care for yourself at home? · Make sure you go to your prenatal appointments. At each visit, your doctor will check your blood pressure. Your doctor will also check to see if you have protein in your urine. High blood pressure and protein in urine are signs of preeclampsia. This condition can be dangerous for you and your baby. · Drink plenty of fluids, enough so that your urine is light yellow or clear like water. Dehydration can cause contractions. If you have kidney, heart, or liver disease and have to limit fluids, talk with your doctor before you increase the amount of fluids you drink. · Tell your doctor right away if you notice any symptoms of an infection, such as: ¨ Burning when you urinate. ¨ A foul-smelling discharge from your vagina. ¨ Vaginal itching. ¨ Unexplained fever. ¨ Unusual pain or soreness in your uterus or lower belly. · Eat a balanced diet. Include plenty of foods that are high in calcium and iron. ¨ Foods high in calcium include milk, cheese, yogurt, almonds, and broccoli. ¨ Foods high in iron include red meat, shellfish, poultry, eggs, beans, raisins, whole-grain bread, and leafy green vegetables. · Do not smoke. If you need help quitting, talk to your doctor about stop-smoking programs and medicines. These can increase your chances of quitting for good. · Do not drink alcohol or use illegal drugs. · Follow your doctor's directions about activity. Your doctor will let you know how much, if any, exercise you can do. · Ask your doctor if you can have sex. If you are at risk for early labor, your doctor may ask you to not have sex. · Take care to prevent falls. During pregnancy, your joints are loose, and your balance is off.  Sports such as bicycling, skiing, or in-line skating can increase your risk of falling. And don't ride horses or motorcycles, dive, water ski, scuba dive, or parachute jump while you are pregnant. · Avoid getting very hot. Do not use saunas or hot tubs. Avoid staying out in the sun in hot weather for long periods. Take acetaminophen (Tylenol) to lower a high fever. · Do not take any over-the-counter or herbal medicines or supplements without talking to your doctor or pharmacist first. 
When should you call for help? Call 911 anytime you think you may need emergency care. For example, call if: 
? · You passed out (lost consciousness). ? · You have severe vaginal bleeding. ? · You have severe pain in your belly or pelvis. ? · You have had fluid gushing or leaking from your vagina and you know or think the umbilical cord is bulging into your vagina. If this happens, immediately get down on your knees so your rear end (buttocks) is higher than your head. This will decrease the pressure on the cord until help arrives. · ?Call your doctor now or seek immediate medical care if: 
? · You have signs of preeclampsia, such as: 
¨ Sudden swelling of your face, hands, or feet. ¨ New vision problems (such as dimness or blurring). ¨ A severe headache. ? · You have any vaginal bleeding. ? · You have belly pain or cramping. ? · You have a fever. ? · You have had regular contractions (with or without pain) for an hour. This means that you have 8 or more within 1 hour or 4 or more in 20 minutes after you change your position and drink fluids. ? · You have a sudden release of fluid from your vagina. ? · You have low back pain or pelvic pressure that does not go away. ? · You notice that your baby has stopped moving or is moving much less than normal. ? Watch closely for changes in your health, and be sure to contact your doctor if you have any problems. Where can you learn more? Go to http://christine.info/. Enter 4701-7207241 in the search box to learn more about \"Pregnancy Precautions: Care Instructions. \" Current as of: March 16, 2017 Content Version: 11.4 © 5606-4901 [employer]. Care instructions adapted under license by [employer] (which disclaims liability or warranty for this information). If you have questions about a medical condition or this instruction, always ask your healthcare professional. [place] [address] any warranty or liability for your use of this information. Week 40 of Your Pregnancy: Care Instructions Your Care Instructions By week 36, you have reached your due date. Your baby could be coming any day. But it's a good idea to think ahead to the next few weeks and what might happen. If this is your first time having a baby, try not to worry. If you don't start labor on your own by 41 or 42 weeks, your doctor may recommend giving you medicines to start labor. This care sheet gives you information about how labor can be started. It also gives you some ideas about breathing exercises you can do if you start to feel anxious or if you are trying to relax. Follow-up care is a key part of your treatment and safety. Be sure to make and go to all appointments, and call your doctor if you are having problems. It's also a good idea to know your test results and keep a list of the medicines you take. How can you care for yourself at home? Learn how labor can be started · If you and your baby are both healthy and ready, and if your cervix has started to open, your doctor may \"break your water\" (rupture the amniotic sac). This often starts labor. · If your cervix is not quite ready, you may get a medicine called Pitocin through an IV to start contractions. · If your cervix is still very firm, you may have prostaglandin tablets (misoprostol) placed in your vagina to soften the cervix. Try guided imagery to help you relax · Find a comfortable place to sit or lie down. Close your eyes. · Start by just taking a few deep breaths to help you relax. · Picture a setting that is calm and peaceful. This could be a beach, a mountain setting, a meadow, or a scene that you choose. · Imagine your scene, and try to add some detail. For example, is there a breeze? What does the radha look like? Is it clear, or are there clouds? · It often helps to add a path to your scene. For example, as you enter the meadow, imagine a path leading you through the meadow to the trees on the other side. As you follow the path farther into the [facility] you feel more and more relaxed. · When you are deep into your scene and are feeling relaxed, take a few minutes to breathe slowly and feel the calm. · When you are ready, slowly take yourself out of the scene back to the present. Tell yourself that you will feel relaxed and refreshed and will bring that sense of calm with you. · Count to 3, and open your eyes. Where can you learn more? Go to http://eddieSensegoncollin.info/. Enter M554 in the search box to learn more about \"Week 40 of Your Pregnancy: Care Instructions. \" Current as of: March 16, 2017 Content Version: 11.4 © 9958-2657 [employer]. Care instructions adapted under license by [employer] (which disclaims liability or warranty for this information). If you have questions about a medical condition or this instruction, always ask your healthcare professional. [place] [address] any warranty or liability for your use of this information. Counting Your Baby's Kicks: Care Instructions Your Care Instructions Counting your baby's kicks is one way your doctor can tell that your baby is healthy. Most women-especially in a first pregnancy-feel their baby move for the first time between 16 and 22 weeks.  The movement may feel like flutters rather than kicks. Your baby may move more at certain times of the day. When you are active, you may notice less kicking than when you are resting. At your prenatal visits, your doctor will ask whether the baby is active. In your last trimester, your doctor may ask you to count the number of times you feel your baby move. Follow-up care is a key part of your treatment and safety. Be sure to make and go to all appointments, and call your doctor if you are having problems. It's also a good idea to know your test results and keep a list of the medicines you take. How do you count fetal kicks? · A common method of checking your baby's movement is to count the number of kicks or moves you feel in 1 hour. Ten movements (such as kicks, flutters, or rolls) in 1 hour are normal. Some doctors suggest that you count in the morning until you get to 10 movements. Then you can quit for that day and start again the next day. · Pick your baby's most active time of day to count. This may be any time from morning to evening. · If you do not feel 10 movements in an hour, your baby may be sleeping. Wait for the next hour and count again. When should you call for help? Call your doctor now or seek immediate medical care if: 
? · You noticed that your baby has stopped moving or is moving much less than normal. ? Watch closely for changes in your health, and be sure to contact your doctor if you have any problems. Where can you learn more? Go to http://eddie-collin.info/. Enter T190 in the search box to learn more about \"Counting Your Baby's Kicks: Care Instructions. \" Current as of: March 16, 2017 Content Version: 11.4 © 0527-4431 [employer]. Care instructions adapted under license by [employer] (which disclaims liability or warranty for this information).  If you have questions about a medical condition or this instruction, always ask your healthcare professional. [address] any warranty or liability for your use of this information. Please provide this summary of care documentation to your next provider. Signatures-by signing, you are acknowledging that this After Visit Summary has been reviewed with you and you have received a copy. Patient Signature:  ____________________________________________________________ Date:  ____________________________________________________________  
  
Paulene Greener Provider Signature:  ____________________________________________________________ Date:  ____________________________________________________________

## 2017-12-21 NOTE — PROGRESS NOTES
Patient presents to L&D with complaint of contractions. , 40w3d w/ hx of scheduled c/s for breech position last pregnancy. Pt states did not labor with last pregnancy. Pt states wants to keep her own shirt and robe on, removes pants independently. Support person present, pt states  is on the way. Pt is smiling and conversational, coping well, breathing and focusing inward through contractions. EFM and toco applied. 1120--  in pt room. Pt knee chest in bed with ball,  rebozo sifting with wrap behind pt. Discussed the importance of being able to  contractions with toco-- pt and  state understanding and will do intermittent rebozo and alternate with counter-pressure instead. 1318-- [facility] notified pt desires to go home    [phone]-- Discharge instructions given with emphasis on signs of uterine rupture, labor precautions, pregnancy precautions, decreased fetal movement. 5426-- Pt discharged from unit.

## 2017-12-21 NOTE — PROGRESS NOTES
[address]  [phone]    Labor Progress Note    Pt breathing through UCs, but also remains cheerful. VE done-no change. Reviewed with pt labor, labor parameters, prodromal labor, maternal exhaustion, IV therapeutic rest, and also discharge home. Reviewed with pt, no cervical change = no labor even with painful UCs. Enc shower and rest; she will think about options and let RN know. Will go to intermittent monitoring per protocol as pt is not in active labor at this time. VS:   Patient Vitals for the past 4 hrs:   BP Temp Pulse SpO2   12/21/17 1228 130/90 - (!) 101 -   12/21/17 1150 129/77 98.6 °F (37 °C) 100 -   12/21/17 1147 - - - 100 %   12/21/17 1142 - - - 100 %   12/21/17 1137 - - - 99 %   12/21/17 1132 - - - 99 %   12/21/17 1127 - - - 99 %   12/21/17 1122 - - - 99 %   12/21/17 1117 - - - 99 %   12/21/17 1112 - - - 99 %   12/21/17 1107 - - - 99 %   12/21/17 1102 - - - 99 %   12/21/17 1057 - - - 100 %   12/21/17 1052 - - - 99 %   12/21/17 1050 - - - 98 %   12/21/17 1045 - - - 99 %   12/21/17 1040 - - - 99 %   12/21/17 1038 131/75 - 99 -   12/21/17 1035 - - - 99 %   12/21/17 1030 - - - 100 %   12/21/17 1025 - - - 98 %   12/21/17 1020 - - - 99 %   12/21/17 1015 - - - 100 %   12/21/17 1010 - - - 100 %   12/21/17 1006 - - - 99 %   12/21/17 1001 - - - 100 %   12/21/17 0956 - - - 97 %   12/21/17 0951 - - - 98 %   12/21/17 0946 - - - 99 %   12/21/17 0941 - - - 100 %   12/21/17 0936 - - - 100 %   12/21/17 0931 - - - 99 %   12/21/17 0926 - - - 99 %   12/21/17 0921 - - - 99 %   12/21/17 0916 - - - 98 %         FHT: Patient Vitals for the past 4 hrs:    Mode Fetal Heart Rate Variability Decelerations Accelerations   12/21/17 1254 External 135 6-25 BPM None No   12/21/17 1245 External 135 6-25 BPM None No   12/21/17 1230 External 135 6-25 BPM None Yes   12/21/17 1215 External 130 6-25 BPM None Yes   12/21/17 1200 External 135 (!) Less than or equal to 5 BPM None No   12/21/17 1145 External 135 - - -   12/21/17 1115 External 135 6-25 BPM None No   12/21/17 1042 External 135 6-25 BPM None Yes   12/21/17 1030 External 135 6-25 BPM None Yes   12/21/17 1000 External 130 6-25 BPM None Yes   12/21/17 0930 External 130 6-25 BPM None Yes       UCs:  Uterine contractions: regular, every 2-4 minutes, 60sec, mild  Amniotic Fluid:  BBOW  CervicalExam:4/100 %/-1/Anterior Vertex         A: IUP@ 40w3d prodromal labor TOLAC  Lab Results   Component Value Date/Time    GrBStrep, External neg 04/18/2016       P: Reevaluate prn       Prodromal labor-IV therapeutic rest or discharge home unless in labor                                 Signed By:  Romain Torres CNM     December 21, 2017

## 2017-12-21 NOTE — PROGRESS NOTES
Monitor off per angela cnm- pt up to shower- intermittent monitoring -pt declining therapeutic rest, interventions or discharge home

## 2017-12-22 PROBLEM — O34.219 VBAC (VAGINAL BIRTH AFTER CESAREAN): Status: ACTIVE | Noted: 2017-12-22

## 2017-12-22 PROBLEM — O47.9 UTERINE CONTRACTIONS DURING PREGNANCY: Status: RESOLVED | Noted: 2017-12-21 | Resolved: 2017-12-22

## 2017-12-22 PROCEDURE — 74011250637 HC RX REV CODE- 250/637

## 2017-12-22 PROCEDURE — 74011250637 HC RX REV CODE- 250/637: Performed by: ADVANCED PRACTICE MIDWIFE

## 2017-12-22 PROCEDURE — 74011250637 HC RX REV CODE- 250/637: Performed by: OBSTETRICS & GYNECOLOGY

## 2017-12-22 PROCEDURE — 65270000029 HC RM PRIVATE

## 2017-12-22 PROCEDURE — 75410000003 HC RECOV DEL/VAG/CSECN EA 0.5 HR

## 2017-12-22 PROCEDURE — 75410000000 HC DELIVERY VAGINAL/SINGLE

## 2017-12-22 PROCEDURE — 74011250636 HC RX REV CODE- 250/636: Performed by: SPECIALIST

## 2017-12-22 PROCEDURE — 74011000250 HC RX REV CODE- 250: Performed by: SPECIALIST

## 2017-12-22 PROCEDURE — 0HQ9XZZ REPAIR PERINEUM SKIN, EXTERNAL APPROACH: ICD-10-PCS | Performed by: OBSTETRICS & GYNECOLOGY

## 2017-12-22 PROCEDURE — 75410000002 HC LABOR FEE PER 1 HR

## 2017-12-22 RX ORDER — LAMOTRIGINE 100 MG/1
200 TABLET ORAL DAILY
Status: DISCONTINUED | OUTPATIENT
Start: 2017-12-22 | End: 2017-12-23

## 2017-12-22 RX ORDER — LAMOTRIGINE 25 MG/1
50 TABLET ORAL DAILY
Status: DISCONTINUED | OUTPATIENT
Start: 2017-12-22 | End: 2017-12-23

## 2017-12-22 RX ORDER — PROMETHAZINE HYDROCHLORIDE 25 MG/ML
25 INJECTION, SOLUTION INTRAMUSCULAR; INTRAVENOUS
Status: DISCONTINUED | OUTPATIENT
Start: 2017-12-22 | End: 2017-12-23 | Stop reason: HOSPADM

## 2017-12-22 RX ORDER — ACETAMINOPHEN 325 MG/1
650 TABLET ORAL
Status: DISCONTINUED | OUTPATIENT
Start: 2017-12-22 | End: 2017-12-23 | Stop reason: HOSPADM

## 2017-12-22 RX ORDER — IBUPROFEN 400 MG/1
800 TABLET ORAL
Status: DISCONTINUED | OUTPATIENT
Start: 2017-12-22 | End: 2017-12-23 | Stop reason: HOSPADM

## 2017-12-22 RX ORDER — AMOXICILLIN 250 MG
1 CAPSULE ORAL 2 TIMES DAILY
Status: DISCONTINUED | OUTPATIENT
Start: 2017-12-22 | End: 2017-12-23 | Stop reason: HOSPADM

## 2017-12-22 RX ORDER — IBUPROFEN 400 MG/1
800 TABLET ORAL
Status: DISCONTINUED | OUTPATIENT
Start: 2017-12-22 | End: 2017-12-22

## 2017-12-22 RX ORDER — SALICYLIC ACID
60 POWDER (GRAM) MISCELLANEOUS ONCE
Status: COMPLETED | OUTPATIENT
Start: 2017-12-22 | End: 2017-12-22

## 2017-12-22 RX ADMIN — LAMOTRIGINE 200 MG: 100 TABLET ORAL at 22:21

## 2017-12-22 RX ADMIN — Medication 125 ML/HR: at 10:30

## 2017-12-22 RX ADMIN — IBUPROFEN 800 MG: 400 TABLET, FILM COATED ORAL at 12:12

## 2017-12-22 RX ADMIN — SODIUM CHLORIDE, SODIUM LACTATE, POTASSIUM CHLORIDE, AND CALCIUM CHLORIDE 500 ML: 600; 310; 30; 20 INJECTION, SOLUTION INTRAVENOUS at 02:10

## 2017-12-22 RX ADMIN — NALBUPHINE HYDROCHLORIDE 10 MG: 10 INJECTION, SOLUTION INTRAMUSCULAR; INTRAVENOUS; SUBCUTANEOUS at 02:22

## 2017-12-22 RX ADMIN — LIDOCAINE HYDROCHLORIDE 30 ML: 10 INJECTION, SOLUTION EPIDURAL; INFILTRATION; INTRACAUDAL; PERINEURAL at 09:50

## 2017-12-22 RX ADMIN — LAMOTRIGINE 50 MG: 25 TABLET ORAL at 22:21

## 2017-12-22 RX ADMIN — CASTOR OIL 60 ML: 1 LIQUID ORAL at 09:40

## 2017-12-22 RX ADMIN — Medication 60 ML: at 09:40

## 2017-12-22 NOTE — PROGRESS NOTES
Offered AROM and reviewed R/B/A of AROM per pt request.  She declines AROM and continues to labor in the bed. FHT accels noted, 135.       Bere Mcgovern CNM

## 2017-12-22 NOTE — PROGRESS NOTES
[facility]  [address] [place] [address]  [phone]    Labor Progress Note    Pt requesting AROM due to feeling tired. VE 10/100/0, AROM large amount of clear fluid. Pt will continue to labor, FHT reassuring. VS:   Patient Vitals for the past 4 hrs:   BP Temp Pulse Resp   17 0506 146/76 98 °F (36.7 °C) 79 18         FHT: Patient Vitals for the past 4 hrs:    Mode Fetal Heart Rate Variability Decelerations Accelerations   17 0700 External 135 6-25 BPM None No   17 0645 External 135 6-25 BPM None Yes   17 0630 External 135 (!) Less than or equal to 5 BPM None No   17 0615 External 135 6-25 BPM None No   17 0600 External 135 6-25 BPM None No   17 0545 External 135 6-25 BPM None No   17 0530 External 130 6-25 BPM None No   17 0515 External 135 (!) Less than or equal to 5 BPM None No   17 0500 External 135 6-25 BPM None No   17 0445 External - - - -   17 0430 External 135 (!) Less than or equal to 5 BPM None No   17 0415 External 135 (!) Less than or equal to 5 BPM None No   17 0359 External 135 (!) Less than or equal to 5 BPM None No   17 0345 External 130 (!) Less than or equal to 5 BPM None No   17 0330 External 130 (!) Less than or equal to 5 BPM None No       UCs:  Uterine contractions: regular, every 1-3 minutes, 60sec, mod  Amniotic Fluid:  clear  CervicalExam:10/100 %/0         A: IUP@ 40w4d TOLAC  Lab Results   Component Value Date/Time    GrBStrep, External negative 2017       P: Reevaluate prn      Anticipate  if fetal head continues to descend                                   Signed By:  José Miguel Hung CNM     2017

## 2017-12-22 NOTE — PROGRESS NOTES
Abad Briceno MD  [address] [facility]  [address]  [phone]                 Labor progress note:       Here to evaluate labor progress. Estimated Gestational Age: 36w2d       Historical Additional  Problem List.: Problem List as of 2017  Date Reviewed: 2017          Codes Class Noted - Resolved    History of  section ICD-10-CM: Z98.891  ICD-9-CM: V45.89  2017 - Present        Seizure disorder ([address] ICD-10-CM: G40.909  ICD-9-CM: 345.90  2017 - Present        Labor and delivery, indication for care ICD-10-CM: O75.9  ICD-9-CM: 659.90  2017 - Present        Uterine contractions during pregnancy ICD-10-CM: O62.2  ICD-9-CM: 661.20  2017 - Present        RESOLVED: Fetal malpresentation ICD-10-CM: O32. 9XX0  ICD-9-CM: 652.90  5/10/2016 - 2016                  Previous pelvic exam:  CervicalExam:10/100 %/0/       Cervical Exam  Dilation (cm): 10  Eff: 100 %  Station: 0  Cervical Consistency: Soft  Vaginal exam done by? : Liz Stanton CNM  Membrane Status: AROM     Membranes  Membrane Status: AROM  Rupture Identifier: Rupture 1  Rupture Date: 17  Rupture Time: 07  Odor: None  Amniotic Fluid Description: Clear  Amniotic Fluid Volume : Moderate     Baseline FHR: Patient Vitals for the past 4 hrs:    Mode Fetal Heart Rate Variability Decelerations Accelerations   17 0800 External 130 6-25 BPM None Yes   17 0745 External 135 (!) Less than or equal to 5 BPM None Yes   17 0730 External 135 6-25 BPM None No   17 0715 External 135 (!) Less than or equal to 5 BPM None No   17 0700 External 135 6-25 BPM None No   17 0645 External 135 6-25 BPM None Yes   17 0630 External 135 (!) Less than or equal to 5 BPM None No   17 0615 External 135 6-25 BPM None No   17 0600 External 135 6-25 BPM None No   17 0545 External 135 6-25 BPM None No   17 0530 External 130 6-25 BPM None No   17 0515 External 135 (!) Less than or equal to 5 BPM None No   17 0500 External 135 6-25 BPM None No        Visit Vitals    /76    Pulse 79    Temp 98 °F (36.7 °C)    Resp 18    SpO2 98%    Breastfeeding No       Temp (24hrs), Av.2 °F (36.8 °C), Min:98 °F (36.7 °C), Max:98.6 °F (37 °C)      WBC   Date/Time Value Ref Range Status   2017 08:20 AM 9.7 4.6 - 13.2 K/uL Final   05/10/2016 06:50 AM 10.1 4.6 - 13.2 K/uL Final     HGB   Date/Time Value Ref Range Status   2017 08:20 AM 13.8 12.0 - 16.0 g/dL Final   2016 07:30 AM 10.8 (L) 12.0 - 16.0 g/dL Final   05/10/2016 06:50 AM 13.3 12.0 - 16.0 g/dL Final     PLATELET   Date/Time Value Ref Range Status   2017 08:20  135 - 420 K/uL Final     Hgb, External   Date/Time Value Ref Range Status   12/10/2015 12.7  Final     Platelet cnt., External   Date/Time Value Ref Range Status   12/10/2015 288  Final              Plan:    Standing and pushing and told clearly it is difficult to monitor the FHR that way and with REJI it is important and she takes responsibility for this risk      The patient and I discussed the benefits and risks of a trial of labor after previous  section including the w .5-1% chance of uterine rupture, possible loss of the baby or hysterectomy. We would monitor in labor and delivery. She is not thought to have a macrosomic baby such that we  would no longer off her labor. We encouraged her to take childbirth classes so she could be even more well informed. She accepts these risks and will communicate with the father to have him assume the same thing.            Chi Sanchez MD

## 2017-12-22 NOTE — PROGRESS NOTES
Pt to LD from home complaining of contractions \"every minute lasting 1-2 minutes long\". Contractions began last night 1800. Pt seen in L&D earlier today and discharged to home. Pt didn't call midwife before coming. Pt placed on monitor, SVE by Eliane John RN.  5/80/-2. Bloody show evident on glove. Bulging membranes. Pt is a tolac, c section May 2016. Pt is I9V2, denies complications in pregnancy, GBS negative.

## 2017-12-22 NOTE — ADT AUTH CERT NOTES
Patient Demographics        Patient Name [address] Sex  Address Phone       Bria Mccray [phone] Female 1990 [address] [phone] (Home)  [phone] (Mobile)           CSN:       [id number]           Admit Date: Admit Time Room Bed       Dec 21, 2017  7:33 PM 3415 [[id number]] [id number] [[id number]]           Attending Providers        Provider Pager From To       Irene Santiago MD  17       ADM  DEL  VAG    Social Documentation   No social documentation on file.    Birth History   Birth Information   Birth Length: 53.5 cm   Birth Weight: 4.17 kg   Birth Head Circ: 34 cm   Gestational Age: 36 4/7 weeks   Delivery Method: Vaginal, Spontaneous Delivery   Duration of Labor: 1st: 11h 30m / 2nd: 2h 28m    APGARs   1 Minute: 5   5 Minute: 7   10 Minute: 9

## 2017-12-22 NOTE — L&D DELIVERY NOTE
Delivery Summary    Patient: Hammad Alaniz MRN: [id number]  SSN: [id number]    YOB: 1990  Age: 32 y.o. Sex: female        Hammad Alaniz pushed spontaneously with good progress. She brought the baby to crown while in left lateral position. Fetal head emerged MAKEDA, unable to release posterior hand from under chin. Loose nuchal cord x 1 easily reduced. Pt advised to continue pushing, hand appeared and assisted to deliver. Idalia continued pushing and easily completed  of LMI. He was lifted to maternal abdomen, but slow to respond. Lowered for placental bolus and began crying and tone improved. Infant back to maternal abdomen, but not maintaining effort to cry without stim. Cord double clamped for cut as pediatrician entered room. Gases collected and sent. Infant to warmer for assessment and returned to mother prior to delivery of placenta. Placenta delivered spontaneously, Supriya Bruce presentation with guarded fundal massage. It was found to be intact with centrally-inserted 3vc. [email or web address] I/O. First degree perineal laceration infused with lidocain 1% and repaired with 3-0 Vicryl. Pt tolerated well. Dr. Jed Brown made aware of delivery.         Labor Events:    Labor: No    Rupture Date: 2017    Rupture Time: 7:17 AM    Rupture Type AROM    Amniotic Fluid Volume:      Amniotic Fluid Description: Clear  None    Induction: None        Augmentation: None    Labor Complications: None     Additional Complications:        Cervical Ripening:       None      Delivery Events:  Episiotomy: None    Laceration(s): First degree perineal      Repaired: Yes     Number of Repair Packets: 1    Suture Type and Size:         Estimated Blood Loss (ml): 250        Information for the patient's Sergo Prince [[id number]]     Delivery Summary - Baby    Delivery Date: 2017   Delivery Time: 9:44 AM   Delivery Type: Vaginal, Spontaneous Delivery  Sex:  male  Gestational Age: 36w2d  Delivery Clinician:  Trixie Bradley Emanuel  Living?: Living   Delivery Location: L&D             APGARS  One minute Five minutes Ten minutes   Skin Color: 0    1   1    Heart Rate: 2   2    2     Reflex Irritability: 1   1    2     Muscle Tone: 1   1  2     Respiration: 1   2    2     Total: 5   7   9        Presentation: Vertex  Position: Left Occiput Anterior  Resuscitation Method:  Tactile Stimulation     Meconium Stained: None    Cord Information: 3 Vessels   Complications: Nuchal Cord With Compressions  Cord Blood Sent?:  Yes    Blood Gases Sent?:  Yes    Placenta:  Date/Time:   9:55 AM  Removal: Spontaneous      Appearance: Normal;Intact      Measurements:  Birth Weight:    pending    Other Providers:   ROB GOLDBERG;KHANH WONG;LORI TORRES;CHACHO FUENTES;MARIMAR FUENTES Midwife;Primary Nurse;Staff Nurse;Pediatrician;Nursery Nurse           Cord Blood Results:  Results for MAYA LUNA (MRN [id number]) as of 2017 11:04   Ref.  Range 2017 10:27 2017 10:32   Specimen type (POC) Latest Units:   CORD BLOOD CORD BLOOD   pH, cord blood (POC) Latest Ref Range: 7.250 - 7.290   6.993 (LL) 7.157 (L)

## 2017-12-22 NOTE — PROGRESS NOTES
2100 Patient out of bathroom, back to bed for consent review, admission, and IV. Reviewed wireless monitoring and TOLAC with patient     2130 Patient to hands/knees over peanut ball. 2200 Switch to wireless monitor; reviewed use of pulse ox with patient to confirm maternal heart rate. Patient able to verbalize understanding. 2240 Patient desires to use shower at this time; pulse ox removed; reviewed s/s to report to staff and safety cord in bathroom. Patient and partner able to verbalize understanding.    6033 Patient remains in shower; FHTs audible in room in 150s; no audible decreases. Reviewed s/s to report with staff at this time. 2307 Patient out of shower to room; encouraged ambulation/movement. 2320 Patient side lying with peanut ball in place; vital signs obtained; patient denies any further needs at this time. [facility] via phone regarding FHR decelerations resolving with position changes. Patient currently side-lying with peanut ball. 2345 Encouraged movement/ambulation; reviewed s/s to report to staff.     0005 Patient requesting SVE: 7-8/100/-2. Reviewed positional changes to aid in labor; patient to try belly sifting with . Reviewed that monitors to stay on patient; patient and  able to verbalize understanding. 8313 Patient to bathroom at this time. 0110 Patient returned to shower at this time; wireless monitors in place. 0130 Patient out of shower to birthing ball     0158 Patient continues to labor on birthing ball; updated CNM per patient request for therapeutic rest. Orders received for IVF bolus and IV pain medication; patient agreeable to this plan. 0220 Medications administered per MAR. Patient side lying, resting in bed at this time. 8703 Patient side lying, with peanut ball in place. Reports some relief from medication administration. Monitors adjusted at this time. 9738 Patient side lying, peanut ball removed.  Sleeping, waking briefly at peak of contractions. 1472 Patient sitting in bed,  and relief  at side. Encouraged rest, reviewed s/s to report to staff; patient able to verbalize understanding. 4716 Patient remains in bed; switch to wired monitor to allow wireless to charge/allow tracing. 4937 Patient requesting SVE; paged CNM. 3300 AALIYAH Hathaway at bedside; SVE: 9/100/0.     0530 Patient to bathroom; reviewed s/s to report to staff and use of safety cord. Patient and  able to verbalize understanding. 0600 Patient continuing to labor; encouraged position changes. Patient asking about possible AROM; encouraged patient and support group to review all options in terms of labor progression, including AROM, epidural, pitocin. 6971 Loss of contact due to maternal positioning.    4414 Patient requesting SVE and AROM; CNM paged.      5110 Patient back to bed at this time; desires to rest.

## 2017-12-22 NOTE — LACTATION NOTE
Mom states baby is nursing great, he has nursed twice for 1 hour each. Experienced mom, still nursing 20 month old. Discussed tandem nursing. Info sheet, daily log, and resource list given. Encouraged to call as needed.

## 2017-12-22 NOTE — ROUTINE PROCESS
1320--Assessment completed. Vitals stable. Educated patient on normal bleeding and when to call nurse for assistance. Fundus firm, lochia small. Patient declines hourly rounding. 1605--Assessment completed. Vitals stable. 1759--Mother doing well. Up without complaints. Voiding without problems. Pain managed well with motrin. Bonding well with baby.

## 2017-12-22 NOTE — H&P
[address] [place] [id number] [phone]     History & Physical    Name: Gilda Kussmaul MRN: [id number]  SSN: [id number]    YOB: 1990  Age: 32 y.o. Gilda Kussmaul is a 32 y.o.  female who is 40w3d by LNMP c/w 1st trimester sono, who is TOLAC who presents in active labor. VE by RN 6-7/100/-2, BBOW. Pt significant for being in L&D earlier today with prodromal labor, high risk for maternal exhaustion. Admitted to 3415/01. Current Pregnancy: started [facility] @ 11 weeks; 1hr gtt 138  2017 EFW sono 2808gms  Hx of seizure disorders-lamictal    Labs:   O positive  RPR negative  Hep C negative  Hep B negative  HIV negative  Gonorrhea/Chlamydia negative  GBS NEGATIVE    OB hx:   OB History      Para Term  AB Living    2 1 1   1    SAB TAB Ectopic Molar Multiple Live Births        0 1       P C/S due to breech VFI    Gyn hx: No hx of abnormal pap smear or hx of STIs this pregnancy    Med Surg hx: No Known Allergies  Prior to Admission medications    Medication Sig Start Date End Date Taking? Authorizing Provider   lamoTRIgine (LAMICTAL) 200 mg tablet Take  by mouth daily. Yes Historical Provider   lamoTRIgine (LAMICTAL XR) 50 mg tr24 ER tablet Take 100 mg by mouth daily. Yes Historical Provider   folic acid (FOLVITE) 1 mg tablet Take 4 mcg by mouth daily. Yes Historical Provider   PRENATAL VIT W-CA,FE,FA,<1 MG, (PRENATAL #2 PO) Take  by mouth. Yes Historical Provider   docusate sodium 100 mg tab Take 1-2 Tabs by mouth daily. 16   Kenji Leo CNM   cholecalciferol (VITAMIN D3) 1,000 unit tablet Take 1,000 Units by mouth daily. Historical Provider   calcium carbonate (TUMS) 200 mg calcium (500 mg) chew Take 1 Tab by mouth daily.     Historical Provider       Past Medical History:   Diagnosis Date    Diabetes ([facility] [address]     gestational diabetes    Epilepsy [facility])       Past Surgical History:   Procedure Laterality Date   DELIVERY ONLY  2016    HX WISDOM TEETH EXTRACTION  2016       Family hx: No family history on file. Social hx:    Social History     Social History    Marital status:      Spouse name: N/A    Number of children: N/A    Years of education: N/A     Occupational History    Not on file. Social History Main Topics    Smoking status: Former Smoker     Quit date:     Smokeless tobacco: Not on file    Alcohol use Yes      Comment: occasional    Drug use: No    Sexual activity: Yes     Partners: Male     Birth control/ protection: None     Other Topics Concern    Not on file     Social History Narrative       O:  Patient Vitals for the past 4 hrs:   BP Temp Pulse Resp   17 1946 135/86 98.2 °F (36.8 °C) (!) 105 16           Fetal Heart Rate:   FrequencyFetal heart variability: moderate  Fetal Heart Rate decelerations: none  Fetal Heart Rate accelerations: yes  Baseline FHR: Patient Vitals for the past 2 hrs:    Mode Fetal Heart Rate Variability Decelerations Accelerations Non Stress Test   17 External 145 6-25 BPM Variable Yes -   17 External 145 6-25 BPM Variable Yes -   17 External 145 6-25 BPM Variable Yes Reactive   17 1937 External 145 - - - -        Uterine Activity:  Uterine contractions: regular, every 1-2 minutes, 60sec, mod  Amniotic Fluid:  BBOW  CervicalExam:6/100 %/-2/  Vertex    Physical Exam:  Patient without distress  HEENT: normal  Neurologically: intact  Chest: clear  Abdomen: consistent with her dates        A: IUP @ 40w3d TOLAC  Lab Results   Component Value Date/Time    GrBStrep, External neg 2016         P: Admit, orders as written      Reevaluate prn      Anticipate       Dr Ulysess Angelucci, MD  notified and aware of admission        Signed By:  Cheryl Maldonado CNM     2017

## 2017-12-22 NOTE — PROGRESS NOTES
Bedside and Verbal shift change report given to Mei Rendon, IDA and FREDY Farley RN (oncoming nurse) by Yolanda Olson. Leonie Jolly RN (offgoing nurse). Report included the following information SBAR, Kardex, Intake/Output, MAR and Recent Results. 8895-- pt states feeling pushy. Pt alternates between sitting on ball leaning on support person and standing at bedside. 7175-- knee chest in bed  0812--pushing. Will remain in room. 0830--attempting to trace fetal heart rate. Pt remains knee chest. Pt mila every 2 min. Encouraged to breath through contractions until able to trace fetal heart rate. Pt continues with involuntary pushing  0838--MICHAELA Russo in pt room. Aware unable to trace FHR. Spoke with pt about internal FHR monitoring  0840-- up to bathroom. Pulse oximeter off. Unable to distinguish between maternal and fetal heart rates. 8439-- back to bed. Pulse oximeter on. 0850--Pt left side lying. Internal fetal electrode placed by cnm  0851-- MICHAELA Russo out of room. Pt coping well  0917--1cm caput noted  0927-- 3cm caput noted. Nursery and CNM called for delivery. Pt coping well  0929-- Nursery and CNM in room  3208--   8989--  of viable male infant  0-- peds called for nursery support  0947-- peds in room  0955-- delivery of placenta  1025-- nir care done    1319-- TRANSFER - OUT REPORT:    Verbal report given to SHAMEKA Welsh RN(name) on Fidel Broussard  being transferred to Mother Baby(unit) for routine progression of care       Report consisted of patients Situation, Background, Assessment and   Recommendations(SBAR). Information from the following report(s) SBAR, Kardex, Intake/Output, MAR and Recent Results was reviewed with the receiving nurse.

## 2017-12-22 NOTE — PROGRESS NOTES
[address]  [address]  [address] [facility]  [phone]    Labor Progress Note    At approx 0200, pt requested IV therapeutic rest.  IV bolus given and pt rested. Awoke and wanted VE.  VE by CNM /0, BBOW almost at introitus. Pt declines AROM. Reviewed with pt, in last 24 hours, she has made 5cm progress and has finally had descent, which I believe was from the relaxation from the IV therapeutic rest.  Reviewed at this time, I very much highly suggest an epidural if she wants a vaginal delivery. And that epidural and IV pitocin augmentation would be the best suggestion for this scenario and a  would be her mode of delivery as she cannot labor indefinitely. Pt shakes her head, breathing, states she does not want an epidural at all and that her mother and her sisters have all had long labors and that she is normal for her family.  at bedside. Reviewed good FHT is what is her saving zaira in all this and I left the room. Will continue to watch. VS:   Patient Vitals for the past 4 hrs:   BP Pulse   17 126/72 78         FHT: Patient Vitals for the past 4 hrs: Mode Fetal Heart Rate Variability Decelerations Accelerations   17 0430 External 135 (!) Less than or equal to 5 BPM None No   17 0415 External 135 (!) Less than or equal to 5 BPM None No   17 0359 External 135 (!) Less than or equal to 5 BPM None No   17 0345 External 130 (!) Less than or equal to 5 BPM None No   17 0330 External 130 (!) Less than or equal to 5 BPM None No   17 0315 External 135 (!) Less than or equal to 5 BPM None No   17 0300 External 135 (!) Less than or equal to 5 BPM None No   17 0245 External 135 6-25 BPM None Yes   17 0239 US Readjusted; External - - - -   17 0230 External 140 6-25 BPM None No   17 0215 US Readjusted; External - - - -   17 0145 External 150 6-25 BPM None No   17 0130 External 150 - - -       UCs:  Uterine contractions: regular, every 1-4 minutes, 60sec, mod  Amniotic Fluid:  IBOW  CervicalExam: 9/100/0         A: IUP@ 40w4d TOLAC  Lab Results   Component Value Date/Time    GrBStrep, External negative 11/17/2017       P: Continue laboring as pt refuses all recommendations      Reevaluate prn      Dr. Jeff White MD notified of pt status                                 Signed By:  Eliezer Gordon CNM     December 22, 2017

## 2017-12-22 NOTE — ROUTINE PROCESS
Verbal shift change report given to Elise Prader, RN (oncoming nurse) by Brittany Cruz RN (offgoing nurse). Report included the following information SBAR.

## 2017-12-22 NOTE — PROGRESS NOTES
[address]  [phone]    Labor Progress Note    Pt continues to decline AROM, VE by RN 7-8/100/-2. Laboring with position changes and in shower. Pt continues with her resolve to do this unmedicated and without interventions. FHT accels noted, fetal tolerance remains well. Will let pt labor a while longer, highly suspecting pt will need epidural and IV pitocin augmentation for possible , though rate of successful  at this time to CNM are dwindling-no fetal descent, slow progression of cervical change. VS:   Patient Vitals for the past 4 hrs:   BP Temp Pulse Resp SpO2   172 127/76 98.2 °F (36.8 °C) 90 18 -   17 2320 - - - - 98 %         FHT: Patient Vitals for the past 4 hrs:    Mode Fetal Heart Rate Variability Decelerations Accelerations   17 0030 External 150 6-25 BPM None Yes   17 2345 External 145 6-25 BPM None No   17 2330 External 135 6-25 BPM Early Yes   17 2315 External 135 6-25 BPM None Yes   17 2300 External 135 6-25 BPM None No   17 2245 External 135 6-25 BPM None No   17 2230 External 135 6-25 BPM None Yes   17 2215 External 140 6-25 BPM None Yes   17 2145 External 140 6-25 BPM None Yes       UCs:  Uterine contractions: regular, every 2-3 minutes, 60sec, mod  Amniotic Fluid:  IBOW  CervicalExam:7-8/100 %/-2         A: IUP@ 40w4d TOLAC  Lab Results   Component Value Date/Time    GrBStrep, External negative 2017       P: Pt wants to continue laboring without intervention at this time       Reevaluate at 0300       Highly suspect epidural and IV pitocin will be needed         Ana Rosa Richey CNM                                          Signed By:  Ana Rosa Richey CNM     2017

## 2017-12-22 NOTE — PROGRESS NOTES
[address] [facility] with Heather Collado CNM by phone. Reported pt status. Orders to recheck in one hour as patient has only dilated 1 cm since discharge earlier today. 2005 - pt standing at bedside, moving during contractions. Explained to pt the need to monitor contractions. 2006 - in bed, hands and knees over yoga ball. 2019 - pt reports pain level of 7/10 during contractions. Refuses intervention, desires unmedicated birth. 2040 - SVE by SAMUEL Mustafa RN. 6/100/-2. Guido Anand CNM updated. Orders to admit pt.  2050 - report given to  Benigno Velazquez RN. Care transferred.

## 2017-12-23 VITALS
OXYGEN SATURATION: 98 % | TEMPERATURE: 98.1 F | DIASTOLIC BLOOD PRESSURE: 72 MMHG | HEART RATE: 88 BPM | RESPIRATION RATE: 18 BRPM | SYSTOLIC BLOOD PRESSURE: 122 MMHG

## 2017-12-23 LAB
HCT VFR BLD AUTO: 38.2 % (ref 35–45)
HGB BLD-MCNC: 12.8 G/DL (ref 12–16)

## 2017-12-23 PROCEDURE — 85018 HEMOGLOBIN: CPT | Performed by: ADVANCED PRACTICE MIDWIFE

## 2017-12-23 PROCEDURE — 36415 COLL VENOUS BLD VENIPUNCTURE: CPT | Performed by: ADVANCED PRACTICE MIDWIFE

## 2017-12-23 PROCEDURE — 85014 HEMATOCRIT: CPT | Performed by: ADVANCED PRACTICE MIDWIFE

## 2017-12-23 PROCEDURE — 74011250637 HC RX REV CODE- 250/637: Performed by: ADVANCED PRACTICE MIDWIFE

## 2017-12-23 RX ADMIN — IBUPROFEN 800 MG: 400 TABLET ORAL at 02:30

## 2017-12-23 NOTE — PROGRESS NOTES
Progress Note    Patient: Laura Franz MRN: [id number]     YOB: 1990  Age: 32 y.o. Subjective:     Postpartum Day: 1    The patient is feeling well. Pain is  well controlled with current medications. Urinary output is adequate. Baby is feeding via breast without difficulty. Objective:      Patient Vitals for the past 12 hrs:   Temp Pulse Resp BP SpO2   17 0330 97.9 °F (36.6 °C) 89 16 128/80 98 %   17 2103 98 °F (36.7 °C) 92 17 124/82 97 %       General:    fatigued   Lochia:  appropriate   Uterine Fundus:   firm @ umbilicus    Perineum:  well-approximated   DVT Evaluation:  No evidence of DVT seen on physical exam.     Lab/Data Review:  No results found for this or any previous visit (from the past 24 hour(s)). All lab results for the last 24 hours reviewed. Assessment:     Delivery: vaginal birth after  ()    Plan:     Doing well postpartum vaginal delivery. Plan DC home today. Follow-up in the office in 6 weeks. Call prn. Current Discharge Medication List      CONTINUE these medications which have NOT CHANGED    Details   lamoTRIgine (LAMICTAL) 200 mg tablet Take  by mouth daily. lamoTRIgine (LAMICTAL XR) 50 mg tr24 ER tablet Take 100 mg by mouth daily. PRENATAL VIT W-CA,FE,FA,<1 MG, (PRENATAL #2 PO) Take  by mouth. docusate sodium 100 mg tab Take 1-2 Tabs by mouth daily. Qty: 60 Tab, Refills: 2      cholecalciferol (VITAMIN D3) 1,000 unit tablet Take 1,000 Units by mouth daily. calcium carbonate (TUMS) 200 mg calcium (500 mg) chew Take 1 Tab by mouth daily.          STOP taking these medications       folic acid (FOLVITE) 1 mg tablet Comments:   Reason for Stopping:               Signed By: Gwendolyn Dooley CNM     2017

## 2017-12-23 NOTE — ROUTINE PROCESS
Bedside shift change report given to VALORIE Sunshine RN (oncoming nurse) by SHAMEKA Chavez RN (offgoing nurse). Report included the following information SBAR, Kardex, Intake/Output, MAR and Recent Results.

## 2017-12-23 NOTE — ROUTINE PROCESS
Bedside and Verbal shift change report given to Barak [address] (oncoming nurse) by Brannon Nova RN (offgoing nurse). Report included the following information SBAR, Procedure Summary, Intake/Output, MAR and Recent Results.

## 2017-12-23 NOTE — DISCHARGE INSTRUCTIONS

## 2017-12-23 NOTE — PROGRESS NOTES
~~ 0800 ASSUME CAR OF PT RESTINGIN BED, SR UP X2, CB IN REACH, BABY SLEEPING IN CRIB, FOB SLEEPING IN CHAIR. PT DENIES NEED FOR PAIN MED, DIFF VOIDING, INCREASED BLEEDING, NAUSEA OR ITCHING. PT HAS FULL WATER MUG- REVIEWED WATER CONSUMPTION. WHITE BOARD FILLED OUT & QUIET TIME REVIEWED. REVIEWED EMERGENCY CORD IN BR & WHEN TO USE- PT VOICES UNDERSTANDING. PT GIVEN TUCKS, DERMAPLAST SPRAY & CHENICAL ICE PACKS W/ INSTRUCTIONS. HL REMOVED. PT TO SHOWER- REVIEWED 1sT SHOWER NOT TOO LONG OR HOT- PT UNDERSTANDS. PT DESIRES TO GO HOME--  WILL REVIEW W/ PEDS-     ~~0815 REG DIET BREAKFAST TRAY SERVED    ~~0945 FAMILY BONDING. NO C/O VOICED- PT FEELING BETTER SINCE SHOWER. ASSESSMENT AS CHARTED    ~~1045 PT RESTING. WATER MUG REFILLED & JUICE SERVED    ~~1130 PT UP IN BR-- FOB SNUGGLING BABY SKIN TO SKIN-  APPROP BONDING OBSERVED    ~~1215 PEDS HAS SEEN BABY & OK'D D/C-- PT GIVEN SAMPLE BAG & WRITTEN D/C INSTRUCTIONS (MOM)    ~~1230 REG DIET LUNCH TRAY SERVED    ~~1345 FOB TAKING BELONGINGS TO THE CAR-- PT PREPARING TO LEAVE-     ~~1430 D/C TEACHING DONE W/ PT 7 FOB WHO ARE BOTH ATTENTIVE & RESPONSIVE TO INFO PROVIDED. ? S ANSWERED-      ~~1450 FOB TO MOVE CAR- PT INSTRUCTED TO CALL WHEN READY TO EXIT-

## 2017-12-23 NOTE — DISCHARGE SUMMARY
Obstetrical Discharge Summary     Name: Keith Norris MRN: [id number]  SSN: [id number]    YOB: 1990  Age: 32 y.o. Sex: female      Admit Date: 2017    Discharge Date: 2017     Admitting Physician: Russell Cui MD     Attending Physician:  Dario Blanca MD     * Admission Diagnoses: assessment  Labor and delivery, indication for care  Uterine contractions during pregnancy    * Discharge Diagnoses:   Information for the patient's :  Prince Lara [[id number]]   Delivery of a 4.17 kg male infant via Vaginal, Spontaneous Delivery on 2017 at 9:44 AM  by . Apgars were 5 and 7. Additional Diagnoses:   Hospital Problems as of 2017  Date Reviewed: 2017          Codes Class Noted - Resolved POA    * (Principal) (vaginal birth after ) ICD-10-CM: O34.219  ICD-9-CM: 654.21  2017 - Present No        RESOLVED: Labor and delivery, indication for care ICD-10-CM: O75.9  ICD-9-CM: 659.90  2017 - 2017 Unknown        RESOLVED: Uterine contractions during pregnancy ICD-10-CM: O62.2  ICD-9-CM: 661.20  2017 - 2017 Unknown             Lab Results   Component Value Date/Time    ABO/Rh(D) [place] POSITIVE 2017 08:20 AM    Rubella, External immune 2017    GrBStrep, External negative 2017    ABO,Rh o positive 12/10/2015      Immunization History   Administered Date(s) Administered    Pneumococcal Polysaccharide (PPSV-23) 2016       * Procedures:          * Discharge Condition: stable    * Hospital Course: Normal hospital course following the delivery. * Disposition: Home    Discharge Medications:   Current Discharge Medication List      CONTINUE these medications which have NOT CHANGED    Details   lamoTRIgine (LAMICTAL) 200 mg tablet Take  by mouth daily. lamoTRIgine (LAMICTAL XR) 50 mg tr24 ER tablet Take 100 mg by mouth daily. PRENATAL VIT W-CA,FE,FA,<1 MG, (PRENATAL #2 PO) Take  by mouth. docusate sodium 100 mg tab Take 1-2 Tabs by mouth daily. Qty: 60 Tab, Refills: 2      cholecalciferol (VITAMIN D3) 1,000 unit tablet Take 1,000 Units by mouth daily. calcium carbonate (TUMS) 200 mg calcium (500 mg) chew Take 1 Tab by mouth daily. STOP taking these medications       folic acid (FOLVITE) 1 mg tablet Comments:   Reason for Stopping:               * Follow-up Care/Patient Instructions:   Activity: Activity as tolerated, No sex for 6 weeks and No heavy lifting for 6 weeks  Diet: Regular Diet    Follow-up Information     Follow up With Details Comments [place], [address] [facility] In 6 weeks  [facility]  [phone]           Signed By:  Ayaz Soria CNM     December 23, 2017